# Patient Record
Sex: MALE | Race: WHITE | Employment: FULL TIME | URBAN - METROPOLITAN AREA
[De-identification: names, ages, dates, MRNs, and addresses within clinical notes are randomized per-mention and may not be internally consistent; named-entity substitution may affect disease eponyms.]

---

## 2019-02-02 ENCOUNTER — HOSPITAL ENCOUNTER (EMERGENCY)
Age: 29
Discharge: HOME | End: 2019-02-02
Attending: EMERGENCY MEDICINE | Admitting: EMERGENCY MEDICINE
Payer: MEDICAID

## 2019-02-02 ENCOUNTER — APPOINTMENT (OUTPATIENT)
Dept: GENERAL RADIOLOGY | Age: 29
End: 2019-02-02
Attending: PHYSICIAN ASSISTANT
Payer: MEDICAID

## 2019-02-02 VITALS
TEMPERATURE: 98.5 F | DIASTOLIC BLOOD PRESSURE: 68 MMHG | SYSTOLIC BLOOD PRESSURE: 118 MMHG | HEIGHT: 65 IN | WEIGHT: 130 LBS | BODY MASS INDEX: 21.66 KG/M2 | HEART RATE: 69 BPM | RESPIRATION RATE: 16 BRPM | OXYGEN SATURATION: 100 %

## 2019-02-02 DIAGNOSIS — G89.29 CHRONIC PAIN OF LEFT WRIST: Primary | ICD-10-CM

## 2019-02-02 DIAGNOSIS — M25.532 CHRONIC PAIN OF LEFT WRIST: Primary | ICD-10-CM

## 2019-02-02 DIAGNOSIS — M79.601 RIGHT ARM PAIN: ICD-10-CM

## 2019-02-02 PROCEDURE — 73090 X-RAY EXAM OF FOREARM: CPT

## 2019-02-02 PROCEDURE — 99283 EMERGENCY DEPT VISIT LOW MDM: CPT

## 2019-02-02 PROCEDURE — 73110 X-RAY EXAM OF WRIST: CPT

## 2019-02-02 PROCEDURE — 75810000053 HC SPLINT APPLICATION

## 2019-02-02 RX ORDER — IBUPROFEN 800 MG/1
800 TABLET ORAL
Qty: 20 TAB | Refills: 0 | Status: SHIPPED | OUTPATIENT
Start: 2019-02-02 | End: 2019-02-09

## 2019-02-02 RX ORDER — IBUPROFEN 400 MG/1
800 TABLET ORAL
Status: DISCONTINUED | OUTPATIENT
Start: 2019-02-02 | End: 2019-02-03 | Stop reason: HOSPADM

## 2019-02-02 RX ORDER — ACETAMINOPHEN 500 MG
1000 TABLET ORAL
Status: DISCONTINUED | OUTPATIENT
Start: 2019-02-02 | End: 2019-02-03 | Stop reason: HOSPADM

## 2019-02-02 NOTE — LETTER
[facility] [place] 
[facility] 
[address] [id number] 
[phone] Work/School Note Date: 2/2/2019 To Whom It May concern: 
 
Augusta Ward was seen and treated today in the emergency room by the following provider(s): 
Attending Provider: Hiawatha Dance, MD 
Physician Assistant: PETEY Simon. Augusta Ward limited use of left hand until cleared by orthopedics. Sincerely, Maddie Madden PA-C

## 2019-02-02 NOTE — ED TRIAGE NOTES
Pt states broke lt hand in 2016, seen here at that time, pt states he had no insurance and was unable to get hand fixed. 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 Pt states he is here to get note for limited duty. Pt states he still does not have insurance.

## 2019-02-02 NOTE — ED PROVIDER NOTES
EMERGENCY DEPARTMENT HISTORY AND PHYSICAL EXAM 
 
Date: 2/2/2019 Patient Name: Dayana Frankel History of Presenting Illness Chief Complaint Patient presents with  
 Hand Pain History Provided By: Patient Chief Complaint: Wrist pain Duration: 1 Days Timing:  Acute Location: Left wrist 
Modifying Factors: Pt notes his wrist was broken a few years ago and he did not get it fixed Associated Symptoms: left wrist swelling Additional History (Context):  
6:52 PM 
Dayana Frankel is a 29 y.o. male who presents to the emergency department C/O left wrist pain onset 5 years that worsened last night. Pt also c/o right arm pain onset a 1 month ago. Pt notes that when he was working last night he moved his left hand \"weirdly\" and it worsened the pain. Associated sxs include left wrist swelling. Pt notes he broke his left wrist a few years ago but did not follow up. Pt denies any other sxs or complaints. PCP: None Past History Past Medical History: 
History reviewed. No pertinent past medical history. Past Surgical History: 
History reviewed. No pertinent surgical history. Family History: 
History reviewed. No pertinent family history. Social History: 
Social History Tobacco Use  Smoking status: Current Some Day Smoker  Smokeless tobacco: Never Used Substance Use Topics  Alcohol use: Yes Comment: celebrations  Drug use: No  
 
 
Allergies: 
No Known Allergies Review of Systems Review of Systems Musculoskeletal: Positive for arthralgias (left wrist), joint swelling (left wrist) and myalgias (left wrist, right arm). Skin: Negative for wound. All other systems reviewed and are negative. Physical Exam  
 
Vitals:  
 02/02/19 1801 BP: 118/68 Pulse: 69 Resp: 16 Temp: 98.5 °F (36.9 °C) SpO2: 100% Weight: 59 kg (130 lb) Height: 5' 5\" (1.651 m) Physical Exam  
Constitutional: He is oriented to person, place, and time.  He appears well-developed and well-nourished. Alert, well appearing, non toxic HENT:  
Head: Normocephalic and atraumatic. Neck: Normal range of motion. Neck supple. Cardiovascular: Normal rate, regular rhythm, normal heart sounds and intact distal pulses. No murmur heard. Pulmonary/Chest: Effort normal and breath sounds normal. No respiratory distress. He has no wheezes. He has no rales. Musculoskeletal:  
     Arms: 
     Hands: 
Neurological: He is alert and oriented to person, place, and time. Skin: Skin is warm and dry. Psychiatric: He has a normal mood and affect. Judgment normal.  
Nursing note and vitals reviewed. Diagnostic Study Results Labs - No results found for this or any previous visit (from the past 12 hour(s)). Radiologic Studies -  
7:40 PM 
RADIOLOGY FINDINGS Left wrist X-ray shows no obvious fracture Pending review by Radiologist 
Recorded by Blair Farias ED Scribe, as dictated by Maddie Madden PA-C 
 
7:41 PM 
RADIOLOGY FINDINGS Right forearm X-ray shows no acute process Pending review by Radiologist 
Recorded by Blair Farias ED Scribe, as dictated by Maddie Madden PA-C 
 
XR WRIST LT AP/LAT/OBL MIN 3V    (Results Pending) XR FOREARM RT AP/LAT    (Results Pending) CT Results  (Last 48 hours) None CXR Results  (Last 48 hours) None Medications given in the ED- Medications  
ibuprofen (MOTRIN) tablet 800 mg (800 mg Oral Refused 2/2/19 2125)  
acetaminophen (TYLENOL) tablet 1,000 mg (1,000 mg Oral Refused 2/2/19 2125) Medical Decision Making I am the first provider for this patient. I reviewed the vital signs, available nursing notes, past medical history, past surgical history, family history and social history. Vital Signs-Reviewed the patient's vital signs. Pulse Oximetry Analysis - 100% on Room Air Records Reviewed: Nursing Notes Provider Notes (Medical Decision Making):  
 
Procedures: 
Procedures Procedure Note - Splint Assessment: 
8:51 PM 
Performed by: Chaz Richardson PA-C Splint to left wrist assessed. Neurovascularly intact. The procedure took 1-15 minutes, and pt tolerated well. Written by Andra Curry, ED Scribe, as dictated by Chaz Richardson PA-C. 
 
 
ED Course:  
6:52 PM  
Initial assessment performed. The patients presenting problems have been discussed, and they are in agreement with the care plan formulated and outlined with them. I have encouraged them to ask questions as they arise throughout their visit. Discussion: Pt presents with acute exacerbation of chronic left wrist pain that has been present since he fractured it 3 years ago. He never followed up with ortho. He is also complaining of right forearm tenderness. No deformities or bony tenderness. XRAYs negative for acute fracture, however given Snuff box tenderness on left, will place in thumb spika splint. Stressed importance of ortho follow up and that he needs to keep splint in place until cleared by orthopedists.  consult entered. Neurovascularly intact. Diagnosis and Disposition DISCHARGE NOTE: 
7:52 PM 
Curtis Mcnally's  results have been reviewed with him. He has been counseled regarding his diagnosis, treatment, and plan. He verbally conveys understanding and agreement of the signs, symptoms, diagnosis, treatment and prognosis and additionally agrees to follow up as discussed. He also agrees with the care-plan and conveys that all of his questions have been answered. I have also provided discharge instructions for him that include: educational information regarding their diagnosis and treatment, and list of reasons why they would want to return to the ED prior to their follow-up appointment, should his condition change. He has been provided with education for proper emergency department utilization. CLINICAL IMPRESSION: 
 
1. Chronic pain of left wrist   
2. Right arm pain PLAN: 
 1. D/C Home 2. There are no discharge medications for this patient. 3.  
Follow-up Information Follow up With Specialties Details Why Contact Info Arben Matos MD Orthopedic Surgery Schedule an appointment as soon as possible for a visit in 3 days For orthopedic follow up [address] [place] [address] [address] [facility] [address] 
[phone] [facility] EMERGENCY DEPT Emergency Medicine Go to As needed, if symptoms worsen [address] 
[address] [address] 
[phone]  
  
 
_______________________________ Attestations: This note is prepared by Maryann Lamas, acting as Scribe for Maddie Madden PA-C. Maddie Madden PA-C:  The scribe's documentation has been prepared under my direction and personally reviewed by me in its entirety. I confirm that the note above accurately reflects all work, treatment, procedures, and medical decision making performed by me. 
_______________________________

## 2019-02-03 NOTE — DISCHARGE INSTRUCTIONS
Patient Education        Joint Pain: Care Instructions  Your Care Instructions    Many people have small aches and pains from overuse or injury to muscles and joints. Joint injuries often happen during sports or recreation, work tasks, or projects around the home. An overuse injury can happen when you put too much stress on a joint or when you do an activity that stresses the joint over and over, such as using the computer or rowing a boat. You can take action at home to help your muscles and joints get better. You should feel better in 1 to 2 weeks, but it can take 3 months or more to heal completely. Follow-up care is a key part of your treatment and safety. Be sure to make and go to all appointments, and call your doctor if you are having problems. It's also a good idea to know your test results and keep a list of the medicines you take. How can you care for yourself at home? · Do not put weight on the injured joint for at least a day or two. · For the first day or two after an injury, do not take hot showers or baths, and do not use hot packs. The heat could make swelling worse. · Put ice or a cold pack on the sore joint for 10 to 20 minutes at a time. Try to do this every 1 to 2 hours for the next 3 days (when you are awake) or until the swelling goes down. Put a thin cloth between the ice and your skin. · Wrap the injury in an elastic bandage. Do not wrap it too tightly because this can cause more swelling. · Prop up the sore joint on a pillow when you ice it or anytime you sit or lie down during the next 3 days. Try to keep it above the level of your heart. This will help reduce swelling. · Take an over-the-counter pain medicine, such as acetaminophen (Tylenol), ibuprofen (Advil, Motrin), or naproxen (Aleve). Read and follow all instructions on the label. · After 1 or 2 days of rest, begin moving the joint gently.  While the joint is still healing, you can begin to exercise using activities that do not strain or hurt the painful joint. When should you call for help? Call your doctor now or seek immediate medical care if:    · You have signs of infection, such as:  ? Increased pain, swelling, warmth, and redness. ? Red streaks leading from the joint. ? A fever.    Watch closely for changes in your health, and be sure to contact your doctor if:    · Your movement or symptoms are not getting better after 1 to 2 weeks of home treatment. Where can you learn more? Go to http://edna-ronaldo.info/. Enter P205 in the search box to learn more about \"Joint Pain: Care Instructions. \"  Current as of: September 20, 2018  Content Version: 11.9  © 2588-1469 [employer]. Care instructions adapted under license by [employer] (which disclaims liability or warranty for this information). If you have questions about a medical condition or this instruction, always ask your healthcare professional. [address] [address] any warranty or liability for your use of this information. Patient Education        Chronic Pain: Care Instructions  Your Care Instructions    Chronic pain is pain that lasts a long time (months or even years) and may or may not have a clear cause. It is different from acute pain, which usually does have a clear cause--like an injury or illness--and gets better over time. Chronic pain:  · Lasts over time but may vary from day to day. · Does not go away despite efforts to end it. · May disrupt your sleep and lead to fatigue. · May cause depression or anxiety. · May make your muscles tense, causing more pain. · Can disrupt your work, hobbies, home life, and relationships with friends and family. Chronic pain is a very real condition. It is not just in your head. Treatment can help and usually includes several methods used together, such as medicines, physical therapy, exercise, and other treatments.  Learning how to relax and changing negative thought patterns can also help you cope. Chronic pain is complex. Taking an active role in your treatment will help you better manage your pain. Tell your doctor if you have trouble dealing with your pain. You may have to try several things before you find what works best for you. Follow-up care is a key part of your treatment and safety. Be sure to make and go to all appointments, and call your doctor if you are having problems. It's also a good idea to know your test results and keep a list of the medicines you take. How can you care for yourself at home? · Pace yourself. Break up large jobs into smaller tasks. Save harder tasks for days when you have less pain, or go back and forth between hard tasks and easier ones. Take rest breaks. · Relax, and reduce stress. Relaxation techniques such as deep breathing or meditation can help. · Keep moving. Gentle, daily exercise can help reduce pain over the long run. Try low- or no-impact exercises such as walking, swimming, and stationary biking. Do stretches to stay flexible. · Try heat, cold packs, and massage. · Get enough sleep. Chronic pain can make you tired and drain your energy. Talk with your doctor if you have trouble sleeping because of pain. · Think positive. Your thoughts can affect your pain level. Do things that you enjoy to distract yourself when you have pain instead of focusing on the pain. See a movie, read a book, listen to music, or spend time with a friend. · If you think you are depressed, talk to your doctor about treatment. · Keep a daily pain diary. Record how your moods, thoughts, sleep patterns, activities, and medicine affect your pain. You may find that your pain is worse during or after certain activities or when you are feeling a certain emotion. Having a record of your pain can help you and your doctor find the best ways to treat your pain. · Take pain medicines exactly as directed.   ? If the doctor gave you a prescription medicine for pain, take it as prescribed. ? If you are not taking a prescription pain medicine, ask your doctor if you can take an over-the-counter medicine. Reducing constipation caused by pain medicine  · Include fruits, vegetables, beans, and whole grains in your diet each day. These foods are high in fiber. · Drink plenty of fluids, enough so that your urine is light yellow or clear like water. If you have kidney, heart, or liver disease and have to limit fluids, talk with your doctor before you increase the amount of fluids you drink. · If your doctor recommends it, get more exercise. Walking is a good choice. Bit by bit, increase the amount you walk every day. Try for at least 30 minutes on most days of the week. · Schedule time each day for a bowel movement. A daily routine may help. Take your time and do not strain when having a bowel movement. When should you call for help? Call your doctor now or seek immediate medical care if:    · Your pain gets worse or is out of control.     · You feel down or blue, or you do not enjoy things like you once did. You may be depressed, which is common in people with chronic pain. Depression can be treated.     · You have vomiting or cramps for more than 2 hours.    Watch closely for changes in your health, and be sure to contact your doctor if:    · You cannot sleep because of pain.     · You are very worried or anxious about your pain.     · You have trouble taking your pain medicine.     · You have any concerns about your pain medicine.     · You have trouble with bowel movements, such as:  ? No bowel movement in 3 days. ? Blood in the anal area, in your stool, or on the toilet paper. ? Diarrhea for more than 24 hours. Where can you learn more? Go to http://edna-ronaldo.info/. Enter N004 in the search box to learn more about \"Chronic Pain: Care Instructions. \"  Current as of: Deepti 3, 2018  Content Version: 11.9  © 2140-7018 [employer]. Care instructions adapted under license by [employer] (which disclaims liability or warranty for this information). If you have questions about a medical condition or this instruction, always ask your healthcare professional. [address] any warranty or liability for your use of this information.

## 2019-04-23 ENCOUNTER — HOSPITAL ENCOUNTER (EMERGENCY)
Age: 29
Discharge: HOME | End: 2019-04-23
Attending: EMERGENCY MEDICINE | Admitting: EMERGENCY MEDICINE
Payer: MEDICAID

## 2019-04-23 VITALS
BODY MASS INDEX: 20.66 KG/M2 | TEMPERATURE: 96.2 F | RESPIRATION RATE: 14 BRPM | SYSTOLIC BLOOD PRESSURE: 113 MMHG | HEIGHT: 65 IN | DIASTOLIC BLOOD PRESSURE: 71 MMHG | WEIGHT: 124 LBS | OXYGEN SATURATION: 100 % | HEART RATE: 57 BPM

## 2019-04-23 DIAGNOSIS — R74.8 ELEVATED LIPASE: ICD-10-CM

## 2019-04-23 DIAGNOSIS — R10.13 ABDOMINAL PAIN, EPIGASTRIC: ICD-10-CM

## 2019-04-23 DIAGNOSIS — F17.210 CIGARETTE NICOTINE DEPENDENCE WITHOUT COMPLICATION: Primary | ICD-10-CM

## 2019-04-23 LAB
ALBUMIN SERPL-MCNC: 4.2 G/DL (ref 3.4–5)
ALBUMIN/GLOB SERPL: 1.4 {RATIO} (ref 0.8–1.7)
ALP SERPL-CCNC: 83 U/L (ref 45–117)
ALT SERPL-CCNC: 34 U/L (ref 16–61)
ANION GAP SERPL CALC-SCNC: 5 MMOL/L (ref 3–18)
APPEARANCE UR: CLEAR
AST SERPL-CCNC: 26 U/L (ref 15–37)
BASOPHILS # BLD: 0 K/UL (ref 0–0.1)
BASOPHILS NFR BLD: 0 % (ref 0–2)
BILIRUB SERPL-MCNC: 1 MG/DL (ref 0.2–1)
BILIRUB UR QL: NEGATIVE
BUN SERPL-MCNC: 13 MG/DL (ref 7–18)
BUN/CREAT SERPL: 14 (ref 12–20)
CALCIUM SERPL-MCNC: 8.9 MG/DL (ref 8.5–10.1)
CHLORIDE SERPL-SCNC: 105 MMOL/L (ref 100–108)
CO2 SERPL-SCNC: 30 MMOL/L (ref 21–32)
COLOR UR: YELLOW
CREAT SERPL-MCNC: 0.94 MG/DL (ref 0.6–1.3)
DIFFERENTIAL METHOD BLD: ABNORMAL
EOSINOPHIL # BLD: 0.2 K/UL (ref 0–0.4)
EOSINOPHIL NFR BLD: 3 % (ref 0–5)
ERYTHROCYTE [DISTWIDTH] IN BLOOD BY AUTOMATED COUNT: 12.6 % (ref 11.6–14.5)
GLOBULIN SER CALC-MCNC: 2.9 G/DL (ref 2–4)
GLUCOSE SERPL-MCNC: 90 MG/DL (ref 74–99)
GLUCOSE UR STRIP.AUTO-MCNC: NEGATIVE MG/DL
HCT VFR BLD AUTO: 47.6 % (ref 36–48)
HGB BLD-MCNC: 16.2 G/DL (ref 13–16)
HGB UR QL STRIP: NEGATIVE
KETONES UR QL STRIP.AUTO: NEGATIVE MG/DL
LEUKOCYTE ESTERASE UR QL STRIP.AUTO: NEGATIVE
LIPASE SERPL-CCNC: 858 U/L (ref 73–393)
LYMPHOCYTES # BLD: 1.8 K/UL (ref 0.9–3.6)
LYMPHOCYTES NFR BLD: 33 % (ref 21–52)
MAGNESIUM SERPL-MCNC: 2.3 MG/DL (ref 1.6–2.6)
MCH RBC QN AUTO: 29.2 PG (ref 24–34)
MCHC RBC AUTO-ENTMCNC: 34 G/DL (ref 31–37)
MCV RBC AUTO: 85.9 FL (ref 74–97)
MONOCYTES # BLD: 0.3 K/UL (ref 0.05–1.2)
MONOCYTES NFR BLD: 6 % (ref 3–10)
NEUTS SEG # BLD: 3.1 K/UL (ref 1.8–8)
NEUTS SEG NFR BLD: 58 % (ref 40–73)
NITRITE UR QL STRIP.AUTO: NEGATIVE
PH UR STRIP: 7 [PH] (ref 5–8)
PLATELET # BLD AUTO: 227 K/UL (ref 135–420)
PMV BLD AUTO: 10.2 FL (ref 9.2–11.8)
POTASSIUM SERPL-SCNC: 4.7 MMOL/L (ref 3.5–5.5)
PROT SERPL-MCNC: 7.1 G/DL (ref 6.4–8.2)
PROT UR STRIP-MCNC: NEGATIVE MG/DL
RBC # BLD AUTO: 5.54 M/UL (ref 4.7–5.5)
SODIUM SERPL-SCNC: 140 MMOL/L (ref 136–145)
SP GR UR REFRACTOMETRY: 1.02 (ref 1–1.03)
UROBILINOGEN UR QL STRIP.AUTO: 0.2 EU/DL (ref 0.2–1)
WBC # BLD AUTO: 5.4 K/UL (ref 4.6–13.2)

## 2019-04-23 PROCEDURE — 83735 ASSAY OF MAGNESIUM: CPT

## 2019-04-23 PROCEDURE — 81003 URINALYSIS AUTO W/O SCOPE: CPT

## 2019-04-23 PROCEDURE — 80053 COMPREHEN METABOLIC PANEL: CPT

## 2019-04-23 PROCEDURE — 96365 THER/PROPH/DIAG IV INF INIT: CPT

## 2019-04-23 PROCEDURE — 99283 EMERGENCY DEPT VISIT LOW MDM: CPT

## 2019-04-23 PROCEDURE — 74011250636 HC RX REV CODE- 250/636: Performed by: EMERGENCY MEDICINE

## 2019-04-23 PROCEDURE — 83690 ASSAY OF LIPASE: CPT

## 2019-04-23 PROCEDURE — 96375 TX/PRO/DX INJ NEW DRUG ADDON: CPT

## 2019-04-23 PROCEDURE — 85025 COMPLETE CBC W/AUTO DIFF WBC: CPT

## 2019-04-23 RX ORDER — ACETAMINOPHEN 325 MG/1
650 TABLET ORAL
Qty: 20 TAB | Refills: 0 | Status: SHIPPED | OUTPATIENT
Start: 2019-04-23 | End: 2019-04-26

## 2019-04-23 RX ORDER — DICYCLOMINE HYDROCHLORIDE 10 MG/1
10 CAPSULE ORAL 4 TIMES DAILY
Qty: 20 CAP | Refills: 0 | Status: SHIPPED | OUTPATIENT
Start: 2019-04-23 | End: 2019-04-28

## 2019-04-23 RX ORDER — FAMOTIDINE 10 MG/ML
20 INJECTION INTRAVENOUS
Status: COMPLETED | OUTPATIENT
Start: 2019-04-23 | End: 2019-04-23

## 2019-04-23 RX ORDER — PANTOPRAZOLE SODIUM 40 MG/1
40 TABLET, DELAYED RELEASE ORAL DAILY
Qty: 20 TAB | Refills: 0 | Status: SHIPPED | OUTPATIENT
Start: 2019-04-23 | End: 2019-05-13

## 2019-04-23 RX ORDER — ACETAMINOPHEN 10 MG/ML
1000 INJECTION, SOLUTION INTRAVENOUS ONCE
Status: COMPLETED | OUTPATIENT
Start: 2019-04-23 | End: 2019-04-23

## 2019-04-23 RX ADMIN — SODIUM CHLORIDE 1000 ML: 900 INJECTION, SOLUTION INTRAVENOUS at 09:14

## 2019-04-23 RX ADMIN — FAMOTIDINE 20 MG: 10 INJECTION, SOLUTION INTRAVENOUS at 09:14

## 2019-04-23 RX ADMIN — ACETAMINOPHEN 1000 MG: 10 INJECTION, SOLUTION INTRAVENOUS at 09:14

## 2019-04-23 NOTE — ED PROVIDER NOTES
EMERGENCY DEPARTMENT HISTORY AND PHYSICAL EXAM 
 
Date: 4/23/2019 Patient Name: Zhang Gaffney History of Presenting Illness Chief Complaint Patient presents with  Abdominal Pain History Provided By: Patient Additional History (Context):  
Zhang Gaffney is a 29 y.o. male with no significant past medical history presents to the emergency department C/O 2 days of generalized upper quadrant achy abdominal pain that circumferential and radiates around his back. Pt denies nausea, vomiting, diarrhea, dysuria, hematuria and any other sxs or complaints. States that he has never had this pain before. He reports that his last bowel movement was 2 days prior. Denies any prior abdominal surgeries. PCP: Prema, MD Mookie 
 
 
 
Past History Past Medical History: 
History reviewed. No pertinent past medical history. Past Surgical History: 
History reviewed. No pertinent surgical history. Family History: 
History reviewed. No pertinent family history. Social History: 
Social History Tobacco Use  Smoking status: Current Some Day Smoker  Smokeless tobacco: Never Used Substance Use Topics  Alcohol use: Yes Comment: celebrations  Drug use: No  
 
 
Allergies: Allergies Allergen Reactions  Ibuprofen Other (comments) Sweats Review of Systems Review of Systems Constitutional: Negative for chills and fever. HENT: Negative for congestion, ear pain, sinus pain and sore throat. Eyes: Negative for pain and visual disturbance. Respiratory: Negative for cough and shortness of breath. Cardiovascular: Negative for chest pain and leg swelling. Gastrointestinal: Positive for abdominal pain. Negative for constipation, diarrhea, nausea and vomiting. Genitourinary: Negative for dysuria and hematuria. Musculoskeletal: Positive for back pain. Negative for neck pain. Skin: Negative for pallor and rash. Neurological: Negative for dizziness, tremors, weakness, light-headedness and headaches. All other systems reviewed and are negative. Physical Exam  
 
Vitals:  
 04/23/19 1948 BP: 113/71 Pulse: (!) 57 Resp: 14 Temp: 96.2 °F (35.7 °C) SpO2: 100% Weight: 56.2 kg (124 lb) Height: 5' 5\" (1.651 m) Physical Exam 
 
Nursing note and vitals reviewed [facility]: [facility] no [facility] distress Head: Normocephalic, Atraumatic Eyes: Pupils are equal, round, and reactive to light, EOMI Neck: Supple, non-tender Cardiovascular: Regular rate and rhythm, no murmurs, rubs, or gallops Chest: Normal work of breathing and chest excursion bilaterally Lungs: Clear to ausculation bilaterally, no wheezes, no rhonchi Abdomen: Soft, non tender, non distended, normoactive bowel sounds Back: No evidence of trauma or deformity Extremities: No evidence of trauma or deformity, no LE edema Skin: Warm and dry, normal cap refill Neuro: Alert and appropriate, CN intact, normal speech, moving all 4 extremities freely and symmetrically Psychiatric: Normal mood and affect Diagnostic Study Results Labs - Recent Results (from the past 12 hour(s)) CBC WITH AUTOMATED DIFF Collection Time: 04/23/19  9:05 AM  
Result Value Ref Range WBC 5.4 4.6 - 13.2 K/uL  
 RBC 5.54 (H) 4.70 - 5.50 M/uL  
 HGB 16.2 (H) 13.0 - 16.0 g/dL HCT 47.6 36.0 - 48.0 % MCV 85.9 74.0 - 97.0 FL  
 MCH 29.2 24.0 - 34.0 PG  
 MCHC 34.0 31.0 - 37.0 g/dL  
 RDW 12.6 11.6 - 14.5 % PLATELET 630 096 - 487 K/uL MPV 10.2 9.2 - 11.8 FL  
 NEUTROPHILS 58 40 - 73 % LYMPHOCYTES 33 21 - 52 % MONOCYTES 6 3 - 10 % EOSINOPHILS 3 0 - 5 % BASOPHILS 0 0 - 2 %  
 ABS. NEUTROPHILS 3.1 1.8 - 8.0 K/UL  
 ABS. LYMPHOCYTES 1.8 0.9 - 3.6 K/UL  
 ABS. MONOCYTES 0.3 0.05 - 1.2 K/UL  
 ABS. EOSINOPHILS 0.2 0.0 - 0.4 K/UL  
 ABS. BASOPHILS 0.0 0.0 - 0.1 K/UL  
 DF AUTOMATED METABOLIC PANEL, COMPREHENSIVE Collection Time: 04/23/19  9:05 AM  
Result Value Ref Range Sodium 140 136 - 145 mmol/L Potassium 4.7 3.5 - 5.5 mmol/L Chloride 105 100 - 108 mmol/L  
 CO2 30 21 - 32 mmol/L Anion gap 5 3.0 - 18 mmol/L Glucose 90 74 - 99 mg/dL BUN 13 7.0 - 18 MG/DL Creatinine 0.94 0.6 - 1.3 MG/DL  
 BUN/Creatinine ratio 14 12 - 20 GFR est AA >60 >60 ml/min/1.73m2 GFR est non-AA >60 >60 ml/min/1.73m2 Calcium 8.9 8.5 - 10.1 MG/DL Bilirubin, total 1.0 0.2 - 1.0 MG/DL  
 ALT (SGPT) 34 16 - 61 U/L  
 AST (SGOT) 26 15 - 37 U/L Alk. phosphatase 83 45 - 117 U/L Protein, total 7.1 6.4 - 8.2 g/dL Albumin 4.2 3.4 - 5.0 g/dL Globulin 2.9 2.0 - 4.0 g/dL A-G Ratio 1.4 0.8 - 1.7 LIPASE Collection Time: 04/23/19  9:05 AM  
Result Value Ref Range Lipase 858 (H) 73 - 393 U/L MAGNESIUM Collection Time: 04/23/19  9:05 AM  
Result Value Ref Range Magnesium 2.3 1.6 - 2.6 mg/dL URINALYSIS W/ RFLX MICROSCOPIC Collection Time: 04/23/19  9:13 AM  
Result Value Ref Range Color YELLOW Appearance CLEAR Specific gravity 1.022 1.005 - 1.030    
 pH (UA) 7.0 5.0 - 8.0 Protein NEGATIVE  NEG mg/dL Glucose NEGATIVE  NEG mg/dL Ketone NEGATIVE  NEG mg/dL Bilirubin NEGATIVE  NEG Blood NEGATIVE  NEG Urobilinogen 0.2 0.2 - 1.0 EU/dL Nitrites NEGATIVE  NEG Leukocyte Esterase NEGATIVE  NEG Radiologic Studies - No orders to display CT Results  (Last 48 hours) None CXR Results  (Last 48 hours) None Medical Decision Making I am the first provider for this patient. I reviewed the vital signs, available nursing notes, past medical history, past surgical history, family history and social history. Vital Signs-Reviewed the patient's vital signs. Pulse Oximetry Analysis -100 % on room air Records Reviewed: Nursing Notes and Old Medical Records Provider Notes: 29 y.o. male presenting with circumferential upper quadrant abdominal pain that radiates around to his back. On exam patient is afebrile with appropriate vital signs. Benign abdominal exam.  Will obtain labs including lipase. We will also check UA to evaluate for hematuria, as patient symptoms and history suspicious for kidney stones. Will treat symptomatically with Pepcid, Tylenol and reassess. Procedures: 
Procedures ED Course:  
8:51 AM 
 Initial assessment performed. The patients presenting problems have been discussed, and they are in agreement with the care plan formulated and outlined with them. I have encouraged them to ask questions as they arise throughout their visit. SMOKING CESSATION: 
The patient was counseled on the dangers of tobacco use, and was advised to quit. Reviewed strategies to maximize success, including removing cigarettes and smoking materials from environment. Discussion took 3-5 minutes, and pt expressed understanding. 10:53 AM 
UA with no hematuria. Not consistent with UTI. Patient's lipase elevated at 853 however not meeting acute pancreatitis criteria. No elevation in LFTs. Patient denying alcohol use. On reassessment, patient continues to have a benign abdominal exam, no indication for emergent imaging studies at this time. Patient able to tolerate p.o. Patient urged to transition to clear liquid diet, bland diet until abdominal pain has completely resolved. Urged close follow-up with his primary care physician. Will discharge with Bentyl, Protonix and Tylenol as needed. Diagnosis and Disposition DISCHARGE NOTE: 
10:53 AM 
 
Kota De La Paz  results have been reviewed with him. He has been counseled regarding his diagnosis, treatment, and plan. He verbally conveys understanding and agreement of the signs, symptoms, diagnosis, treatment and prognosis and additionally agrees to follow up as discussed. He also agrees with the care-plan and conveys that all of his questions have been answered. I have also provided discharge instructions for him that include: educational information regarding their diagnosis and treatment, and list of reasons why they would want to return to the ED prior to their follow-up appointment, should his condition change. He has been provided with education for proper emergency department utilization. CLINICAL IMPRESSION: 
 
1. Cigarette nicotine dependence without complication 2. Elevated lipase 3. Abdominal pain, epigastric PLAN: 
1. D/C Home 2. Current Discharge Medication List  
  
START taking these medications Details  
pantoprazole (PROTONIX) 40 mg tablet Take 1 Tab by mouth daily for 20 days. Qty: 20 Tab, Refills: 0  
  
dicyclomine (BENTYL) 10 mg capsule Take 1 Cap by mouth four (4) times daily for 5 days. Qty: 20 Cap, Refills: 0  
  
acetaminophen (TYLENOL) 325 mg tablet Take 2 Tabs by mouth every four (4) hours as needed for Pain. Qty: 20 Tab, Refills: 0  
  
  
 
3. Follow-up Information Follow up With Specialties Details Why Contact Info [address]  Schedule an appointment as soon as possible for a visit in 2 days  [address] [place] [address] [address] [place] [address] 
[phone] [facility] EMERGENCY DEPT Emergency Medicine  As needed if symptoms worsen [address] [place] [address] [phone]  
  
 
____________________________________ Please note that this dictation was completed with [employer], the [employer] voice recognition software. Quite often unanticipated grammatical, syntax, homophones, and other interpretive errors are inadvertently transcribed by the computer software. Please disregard these errors. Please excuse any errors that have escaped final proofreading.

## 2019-04-23 NOTE — ED TRIAGE NOTES
Patient ambulatory to ED with C/O left sided abdominal Pain radiating to back that started 2 days ago. Patient denies fever

## 2019-04-23 NOTE — LETTER
[facility] [place] 
[facility] 
[address] [address] 
[phone] Work/School Note Date: 4/23/2019 To Whom It May concern: 
 
Jagdeep Robb was seen and treated today in the emergency room by the following provider(s): 
Attending Provider: Genesis Rodriguez DO. Jagdeep Robb may return to work on 04/25/19 Sincerely,  Merissa Barry, DO

## 2019-04-23 NOTE — DISCHARGE INSTRUCTIONS
You were seen and evaluated in the Emergency Department. Please understand that your work up is not all encompassing and you should follow up with your primary care physician for further management and continuity of care. Please return to Emergency Department or seek medical attention immediately if you have acute worsening in your symptoms or develop chest pain, shortness of breath, repeated vomiting, fever, altered level of consciousness, coughing up blood, or start sweating and feel clammy. If you were prescribed any medicine for home, please take as prescribed by your health-care provider. If you were given any follow-up appointments or numbers to call, please do so as instructed. Avoid any tobacco products or excessive alcohol. Patient Education        Abdominal Pain: Care Instructions  Your Care Instructions    Abdominal pain has many possible causes. Some aren't serious and get better on their own in a few days. Others need more testing and treatment. If your pain continues or gets worse, you need to be rechecked and may need more tests to find out what is wrong. You may need surgery to correct the problem. Don't ignore new symptoms, such as fever, nausea and vomiting, urination problems, pain that gets worse, and dizziness. These may be signs of a more serious problem. Your doctor may have recommended a follow-up visit in the next 8 to 12 hours. If you are not getting better, you may need more tests or treatment. The doctor has checked you carefully, but problems can develop later. If you notice any problems or new symptoms, get medical treatment right away. Follow-up care is a key part of your treatment and safety. Be sure to make and go to all appointments, and call your doctor if you are having problems. It's also a good idea to know your test results and keep a list of the medicines you take. How can you care for yourself at home? · Rest until you feel better.   · To prevent dehydration, drink plenty of fluids, enough so that your urine is light yellow or clear like water. Choose water and other caffeine-free clear liquids until you feel better. If you have kidney, heart, or liver disease and have to limit fluids, talk with your doctor before you increase the amount of fluids you drink. · If your stomach is upset, eat mild foods, such as rice, dry toast or crackers, bananas, and applesauce. Try eating several small meals instead of two or three large ones. · Wait until 48 hours after all symptoms have gone away before you have spicy foods, alcohol, and drinks that contain caffeine. · Do not eat foods that are high in fat. · Avoid anti-inflammatory medicines such as aspirin, ibuprofen (Advil, Motrin), and naproxen (Aleve). These can cause stomach upset. Talk to your doctor if you take daily aspirin for another health problem. When should you call for help? Call 911 anytime you think you may need emergency care. For example, call if:    · You passed out (lost consciousness).     · You pass maroon or very bloody stools.     · You vomit blood or what looks like coffee grounds.     · You have new, severe belly pain.    Call your doctor now or seek immediate medical care if:    · Your pain gets worse, especially if it becomes focused in one area of your belly.     · You have a new or higher fever.     · Your stools are black and look like tar, or they have streaks of blood.     · You have unexpected vaginal bleeding.     · You have symptoms of a urinary tract infection. These may include:  ? Pain when you urinate. ? Urinating more often than usual.  ? Blood in your urine.     · You are dizzy or lightheaded, or you feel like you may faint.    Watch closely for changes in your health, and be sure to contact your doctor if:    · You are not getting better after 1 day (24 hours). Where can you learn more? Go to http://edna-ronaldo.info/.   Enter J573 in the search box to learn more about \"Abdominal Pain: Care Instructions. \"  Current as of: September 23, 2018  Content Version: 11.9  © 2598-1520 [employer]. Care instructions adapted under license by [employer] (which disclaims liability or warranty for this information). If you have questions about a medical condition or this instruction, always ask your healthcare professional. [facility] any warranty or liability for your use of this information. Patient Education        Indigestion (Dyspepsia or Heartburn): Care Instructions  Your Care Instructions  Sometimes it can be hard to pinpoint the cause of indigestion. (It is also called dyspepsia or heartburn.) Most cases of an upset stomach with bloating, burning, burping, and nausea are minor and go away within several hours. Home treatment and over-the-counter medicine often are able to control symptoms. But if you take medicine to relieve your indigestion without making diet and lifestyle changes, your symptoms are likely to return again and again. If you get indigestion often, it may be a sign of a more serious medical problem. Be sure to follow up with your doctor, who may want to do tests to be sure of the cause of your indigestion. Follow-up care is a key part of your treatment and safety. Be sure to make and go to all appointments, and call your doctor if you are having problems. It's also a good idea to know your test results and keep a list of the medicines you take. How can you care for yourself at home? · Your doctor may recommend over-the-counter medicine. For mild or occasional indigestion, antacids such as Gaviscon, Mylanta, Maalox, or Tums, may help. Be safe with medicines. Be careful when you take over-the-counter antacid medicines. Many of these medicines have aspirin in them. Read the label to make sure that you are not taking more than the recommended dose. Too much aspirin can be harmful.   · Your doctor also may recommend over-the-counter acid reducers, such as Pepcid AC, Tagamet HB, Zantac 75, or Prilosec. Read and follow all instructions on the label. If you use these medicines often, talk with your doctor. · Change your eating habits. ? It's best to eat several small meals instead of two or three large meals. ? After you eat, wait 2 to 3 hours before you lie down. ? Chocolate, mint, and alcohol can make GERD worse. ? Spicy foods, foods that have a lot of acid (like tomatoes and oranges), and coffee can make GERD symptoms worse in some people. If your symptoms are worse after you eat a certain food, you may want to stop eating that food to see if your symptoms get better. · Do not smoke or chew tobacco. Smoking can make GERD worse. If you need help quitting, talk to your doctor about stop-smoking programs and medicines. These can increase your chances of quitting for good. · If you have GERD symptoms at night, raise the head of your bed 6 to 8 inches. You can do this by putting the frame on blocks or placing a foam wedge under the head of your mattress. (Adding extra pillows does not work.)  · Do not wear tight clothing around your middle. · Lose weight if you need to. Losing just 5 to 10 pounds can help. · Do not take anti-inflammatory medicines, such as aspirin, ibuprofen (Advil, Motrin), or naproxen (Aleve). These can irritate the stomach. If you need a pain medicine, try acetaminophen (Tylenol), which does not cause stomach upset. When should you call for help? Call your doctor now or seek immediate medical care if:    · You have new or worse belly pain.     · You are vomiting.    Watch closely for changes in your health, and be sure to contact your doctor if:    · You have new or worse symptoms of indigestion.     · You have trouble or pain swallowing.     · You are losing weight.     · You do not get better as expected. Where can you learn more?   Go to http://ednaHealthDataInsights.info/. Enter W233 in the search box to learn more about \"Indigestion (Dyspepsia or Heartburn): Care Instructions. \"  Current as of: March 27, 2018  Content Version: 11.9  © 6710-5811 [email or web address]. Care instructions adapted under license by [employer] (which disclaims liability or warranty for this information). If you have questions about a medical condition or this instruction, always ask your healthcare professional. [place] [address] any warranty or liability for your use of this information. Patient Education        Learning About Clear Liquid Diets  Introduction    Sometimes you have to stop eating solid food for a while. This may be because you're preparing for a medical procedure or recovering from one. Or solid food might irritate your digestive tract because of illness. But even when you can't eat solid food, your body still needs liquids and energy. A clear liquid diet gives your body basic nutrition until you can go back to eating solid food. Your body digests these foods easily, and these foods don't leave behind any solids as they pass through your system. The clear liquid diet helps reduce the nausea and diarrhea you may have while you're ill. A clear liquid diet isn't very satisfying. And it doesn't supply all the nutrients you need over the long term. But you will be on this diet for just a day or two. After that, you will probably start eating bland foods. Soon you'll be able to return to your regular diet. Examples  A clear liquid diet may include:  · Clear, fat-free broth and bouillon. · Water, ice chips, and flavored ice pops. · Strained fruit juices and flavored fruit drinks. · Fruit-flavored gelatin, like Jell-O.  · Soft drinks like lemon-lime soda and ginger ale. · Sweetened coffee or tea without cream.  Where can you learn more? Go to http://ednaMo Industries Holdingsronaldo.info/.   Enter C536 in the search box to learn more about \"Learning About Clear Liquid Diets. \"  Current as of: March 28, 2018  Content Version: 11.9  © 6366-3480 [employer]. Care instructions adapted under license by [employer] (which disclaims liability or warranty for this information). If you have questions about a medical condition or this instruction, always ask your healthcare professional. [address] any warranty or liability for your use of this information. Patient Education        Soft-Textured, Tameka Cam Diet: Care Instructions  Your Care Instructions    A soft-textured, bland diet is used when you need food that is easy to chew, swallow, and digest. You will need to choose soft foods that are low in spices and seasonings. You will need to avoid high-fat foods, as well as caffeine and alcohol. Your doctor or dietitian can help you plan a soft-textured, bland diet based on your health and what you prefer to eat. Ask your doctor how long you should stay on this diet. As you get better, you will probably be able to go back to a regular diet. Talk with your doctor or dietitian before you make changes in your diet. Follow-up care is a key part of your treatment and safety. Be sure to make and go to all appointments, and call your doctor if you are having problems. It's also a good idea to know your test results and keep a list of the medicines you take. How can you care for yourself at home? · Choose foods that are easy to chew and swallow. Good choices are mashed potatoes, soft breads and rolls, cream soups, oatmeal, and Cream of Wheat. · Choose soft, well-cooked vegetables and soft or canned fruits. Good choices are applesauce, ripe bananas, and non-citrus fruit juice. · Try milk, yogurt, or other milk products, if you can digest dairy without too many problems. Your doctor may limit milk and milk products for a while.  If so, he or she may recommend a calcium and vitamin D supplement. · Choose soft protein foods such as eggs, tofu, steamed fish, chicken, and turkey. Slow-cooking methods, such as stewing, will help soften meat. Chopping meat in a  or  also will make it easier to eat. · Avoid nuts, raw vegetables, hard crackers, tough meats, and prunes and prune juice. · Avoid foods that are very spicy, such as foods seasoned with black pepper, chili peppers, horseradish, or hot sauce. · Avoid highly acidic foods such as citrus fruits, citrus fruit juices, and tomato-based foods. · Avoid high-fat foods such as fried meat, chips, and rich desserts. · Check with your doctor before you drink alcohol or beverages that have caffeine, such as coffee, tea, and cola beverages. Where can you learn more? Go to http://edna-ronaldo.info/. Enter A672 in the search box to learn more about \"Soft-Textured, Tamara Barr Diet: Care Instructions. \"  Current as of: March 28, 2018  Content Version: 11.9  © 5968-0793 [employer]. Care instructions adapted under license by [employer] (which disclaims liability or warranty for this information). If you have questions about a medical condition or this instruction, always ask your healthcare professional. [address] any warranty or liability for your use of this information.

## 2019-04-26 ENCOUNTER — APPOINTMENT (OUTPATIENT)
Dept: GENERAL RADIOLOGY | Age: 29
End: 2019-04-26
Attending: EMERGENCY MEDICINE
Payer: MEDICAID

## 2019-04-26 ENCOUNTER — APPOINTMENT (OUTPATIENT)
Dept: ULTRASOUND IMAGING | Age: 29
End: 2019-04-26
Attending: EMERGENCY MEDICINE
Payer: MEDICAID

## 2019-04-26 ENCOUNTER — HOSPITAL ENCOUNTER (EMERGENCY)
Age: 29
Discharge: HOME | End: 2019-04-26
Attending: EMERGENCY MEDICINE
Payer: MEDICAID

## 2019-04-26 VITALS
BODY MASS INDEX: 21.83 KG/M2 | WEIGHT: 131 LBS | TEMPERATURE: 98.1 F | HEART RATE: 91 BPM | RESPIRATION RATE: 18 BRPM | DIASTOLIC BLOOD PRESSURE: 75 MMHG | OXYGEN SATURATION: 100 % | HEIGHT: 65 IN | SYSTOLIC BLOOD PRESSURE: 115 MMHG

## 2019-04-26 DIAGNOSIS — F17.200 TOBACCO USE DISORDER: ICD-10-CM

## 2019-04-26 DIAGNOSIS — R10.13 ABDOMINAL PAIN, EPIGASTRIC: Primary | ICD-10-CM

## 2019-04-26 LAB
ALBUMIN SERPL-MCNC: 3.7 G/DL (ref 3.4–5)
ALBUMIN/GLOB SERPL: 1.5 {RATIO} (ref 0.8–1.7)
ALP SERPL-CCNC: 73 U/L (ref 45–117)
ALT SERPL-CCNC: 39 U/L (ref 16–61)
ANION GAP SERPL CALC-SCNC: 8 MMOL/L (ref 3–18)
APPEARANCE UR: CLEAR
AST SERPL-CCNC: 23 U/L (ref 15–37)
BASOPHILS # BLD: 0 K/UL (ref 0–0.1)
BASOPHILS NFR BLD: 0 % (ref 0–2)
BILIRUB SERPL-MCNC: 0.3 MG/DL (ref 0.2–1)
BILIRUB UR QL: NEGATIVE
BUN SERPL-MCNC: 14 MG/DL (ref 7–18)
BUN/CREAT SERPL: 15 (ref 12–20)
CALCIUM SERPL-MCNC: 8.6 MG/DL (ref 8.5–10.1)
CHLORIDE SERPL-SCNC: 108 MMOL/L (ref 100–108)
CO2 SERPL-SCNC: 27 MMOL/L (ref 21–32)
COLOR UR: YELLOW
CREAT SERPL-MCNC: 0.95 MG/DL (ref 0.6–1.3)
DIFFERENTIAL METHOD BLD: NORMAL
EOSINOPHIL # BLD: 0.2 K/UL (ref 0–0.4)
EOSINOPHIL NFR BLD: 3 % (ref 0–5)
ERYTHROCYTE [DISTWIDTH] IN BLOOD BY AUTOMATED COUNT: 12.4 % (ref 11.6–14.5)
GLOBULIN SER CALC-MCNC: 2.5 G/DL (ref 2–4)
GLUCOSE SERPL-MCNC: 106 MG/DL (ref 74–99)
GLUCOSE UR STRIP.AUTO-MCNC: NEGATIVE MG/DL
HCT VFR BLD AUTO: 41.8 % (ref 36–48)
HGB BLD-MCNC: 14.3 G/DL (ref 13–16)
HGB UR QL STRIP: NEGATIVE
KETONES UR QL STRIP.AUTO: NEGATIVE MG/DL
LEUKOCYTE ESTERASE UR QL STRIP.AUTO: NEGATIVE
LIPASE SERPL-CCNC: 169 U/L (ref 73–393)
LYMPHOCYTES # BLD: 1.8 K/UL (ref 0.9–3.6)
LYMPHOCYTES NFR BLD: 39 % (ref 21–52)
MCH RBC QN AUTO: 29.2 PG (ref 24–34)
MCHC RBC AUTO-ENTMCNC: 34.2 G/DL (ref 31–37)
MCV RBC AUTO: 85.3 FL (ref 74–97)
MONOCYTES # BLD: 0.4 K/UL (ref 0.05–1.2)
MONOCYTES NFR BLD: 7 % (ref 3–10)
NEUTS SEG # BLD: 2.4 K/UL (ref 1.8–8)
NEUTS SEG NFR BLD: 51 % (ref 40–73)
NITRITE UR QL STRIP.AUTO: NEGATIVE
PH UR STRIP: 7 [PH] (ref 5–8)
PLATELET # BLD AUTO: 194 K/UL (ref 135–420)
PMV BLD AUTO: 10 FL (ref 9.2–11.8)
POTASSIUM SERPL-SCNC: 3.7 MMOL/L (ref 3.5–5.5)
PROT SERPL-MCNC: 6.2 G/DL (ref 6.4–8.2)
PROT UR STRIP-MCNC: NEGATIVE MG/DL
RBC # BLD AUTO: 4.9 M/UL (ref 4.7–5.5)
SODIUM SERPL-SCNC: 143 MMOL/L (ref 136–145)
SP GR UR REFRACTOMETRY: 1.01 (ref 1–1.03)
UROBILINOGEN UR QL STRIP.AUTO: 0.2 EU/DL (ref 0.2–1)
WBC # BLD AUTO: 4.7 K/UL (ref 4.6–13.2)

## 2019-04-26 PROCEDURE — 76705 ECHO EXAM OF ABDOMEN: CPT

## 2019-04-26 PROCEDURE — 96361 HYDRATE IV INFUSION ADD-ON: CPT

## 2019-04-26 PROCEDURE — 99284 EMERGENCY DEPT VISIT MOD MDM: CPT

## 2019-04-26 PROCEDURE — 80053 COMPREHEN METABOLIC PANEL: CPT

## 2019-04-26 PROCEDURE — C9113 INJ PANTOPRAZOLE SODIUM, VIA: HCPCS | Performed by: EMERGENCY MEDICINE

## 2019-04-26 PROCEDURE — 74011250637 HC RX REV CODE- 250/637: Performed by: EMERGENCY MEDICINE

## 2019-04-26 PROCEDURE — 85025 COMPLETE CBC W/AUTO DIFF WBC: CPT

## 2019-04-26 PROCEDURE — 74011000250 HC RX REV CODE- 250: Performed by: EMERGENCY MEDICINE

## 2019-04-26 PROCEDURE — 81003 URINALYSIS AUTO W/O SCOPE: CPT

## 2019-04-26 PROCEDURE — 74022 RADEX COMPL AQT ABD SERIES: CPT

## 2019-04-26 PROCEDURE — 74011250636 HC RX REV CODE- 250/636: Performed by: EMERGENCY MEDICINE

## 2019-04-26 PROCEDURE — 83690 ASSAY OF LIPASE: CPT

## 2019-04-26 PROCEDURE — 96374 THER/PROPH/DIAG INJ IV PUSH: CPT

## 2019-04-26 PROCEDURE — 93005 ELECTROCARDIOGRAM TRACING: CPT

## 2019-04-26 PROCEDURE — 96375 TX/PRO/DX INJ NEW DRUG ADDON: CPT

## 2019-04-26 RX ORDER — PANTOPRAZOLE SODIUM 40 MG/10ML
40 INJECTION, POWDER, LYOPHILIZED, FOR SOLUTION INTRAVENOUS
Status: COMPLETED | OUTPATIENT
Start: 2019-04-26 | End: 2019-04-26

## 2019-04-26 RX ORDER — FENTANYL CITRATE 50 UG/ML
100 INJECTION, SOLUTION INTRAMUSCULAR; INTRAVENOUS ONCE
Status: COMPLETED | OUTPATIENT
Start: 2019-04-26 | End: 2019-04-26

## 2019-04-26 RX ORDER — ONDANSETRON 4 MG/1
4 TABLET, ORALLY DISINTEGRATING ORAL
Qty: 9 TAB | Refills: 0 | Status: SHIPPED | OUTPATIENT
Start: 2019-04-26 | End: 2019-09-22

## 2019-04-26 RX ORDER — PANTOPRAZOLE SODIUM 40 MG/1
40 TABLET, DELAYED RELEASE ORAL DAILY
Qty: 20 TAB | Refills: 0 | Status: SHIPPED | OUTPATIENT
Start: 2019-04-26 | End: 2019-05-16

## 2019-04-26 RX ORDER — SUCRALFATE 1 G/10ML
1 SUSPENSION ORAL 4 TIMES DAILY
Qty: 400 ML | Refills: 0 | Status: SHIPPED | OUTPATIENT
Start: 2019-04-26 | End: 2019-09-22

## 2019-04-26 RX ORDER — HYDROCODONE BITARTRATE AND ACETAMINOPHEN 5; 325 MG/1; MG/1
1 TABLET ORAL
Qty: 20 TAB | Refills: 0 | Status: SHIPPED | OUTPATIENT
Start: 2019-04-26 | End: 2019-04-29

## 2019-04-26 RX ORDER — ONDANSETRON 2 MG/ML
4 INJECTION INTRAMUSCULAR; INTRAVENOUS ONCE
Status: COMPLETED | OUTPATIENT
Start: 2019-04-26 | End: 2019-04-26

## 2019-04-26 RX ADMIN — FENTANYL CITRATE 100 MCG: 50 INJECTION, SOLUTION INTRAMUSCULAR; INTRAVENOUS at 14:30

## 2019-04-26 RX ADMIN — PANTOPRAZOLE SODIUM 40 MG: 40 INJECTION, POWDER, FOR SOLUTION INTRAVENOUS at 14:26

## 2019-04-26 RX ADMIN — LIDOCAINE HYDROCHLORIDE 40 ML: 20 SOLUTION ORAL; TOPICAL at 15:11

## 2019-04-26 RX ADMIN — SODIUM CHLORIDE 1000 ML: 900 INJECTION, SOLUTION INTRAVENOUS at 14:27

## 2019-04-26 RX ADMIN — ONDANSETRON 4 MG: 2 INJECTION INTRAMUSCULAR; INTRAVENOUS at 14:29

## 2019-04-26 NOTE — ED NOTES
Bedside and Verbal shift change report given to DILEEP English (oncoming nurse) by Iglesia Ricardo RN 
 (offgoing nurse). Report included the following information SBAR, Kardex, ED Summary and Recent Results.

## 2019-04-26 NOTE — DISCHARGE INSTRUCTIONS
Abdominal Pain: Care Instructions  Your Care Instructions    Abdominal pain has many possible causes. Some aren't serious and get better on their own in a few days. Others need more testing and treatment. If your pain continues or gets worse, you need to be rechecked and may need more tests to find out what is wrong. You may need surgery to correct the problem. Don't ignore new symptoms, such as fever, nausea and vomiting, urination problems, pain that gets worse, and dizziness. These may be signs of a more serious problem. Your doctor may have recommended a follow-up visit in the next 8 to 12 hours. If you are not getting better, you may need more tests or treatment. The doctor has checked you carefully, but problems can develop later. If you notice any problems or new symptoms, get medical treatment right away. Follow-up care is a key part of your treatment and safety. Be sure to make and go to all appointments, and call your doctor if you are having problems. It's also a good idea to know your test results and keep a list of the medicines you take. How can you care for yourself at home? · Rest until you feel better. · To prevent dehydration, drink plenty of fluids, enough so that your urine is light yellow or clear like water. Choose water and other caffeine-free clear liquids until you feel better. If you have kidney, heart, or liver disease and have to limit fluids, talk with your doctor before you increase the amount of fluids you drink. · If your stomach is upset, eat mild foods, such as rice, dry toast or crackers, bananas, and applesauce. Try eating several small meals instead of two or three large ones. · Wait until 48 hours after all symptoms have gone away before you have spicy foods, alcohol, and drinks that contain caffeine. · Do not eat foods that are high in fat. · Avoid anti-inflammatory medicines such as aspirin, ibuprofen (Advil, Motrin), and naproxen (Aleve).  These can cause stomach upset. Talk to your doctor if you take daily aspirin for another health problem. When should you call for help? Call 911 anytime you think you may need emergency care. For example, call if:    · You passed out (lost consciousness).     · You pass maroon or very bloody stools.     · You vomit blood or what looks like coffee grounds.     · You have new, severe belly pain.    Call your doctor now or seek immediate medical care if:    · Your pain gets worse, especially if it becomes focused in one area of your belly.     · You have a new or higher fever.     · Your stools are black and look like tar, or they have streaks of blood.     · You have unexpected vaginal bleeding.     · You have symptoms of a urinary tract infection. These may include:  ? Pain when you urinate. ? Urinating more often than usual.  ? Blood in your urine.     · You are dizzy or lightheaded, or you feel like you may faint.    Watch closely for changes in your health, and be sure to contact your doctor if:    · You are not getting better after 1 day (24 hours). Where can you learn more? Go to http://edna-ronaldo.info/. Enter H621 in the search box to learn more about \"Abdominal Pain: Care Instructions. \"  Current as of: September 23, 2018  Content Version: 11.9  © 0065-3340 [employer] Care instructions adapted under license by [employer] (which disclaims liability or warranty for this information). If you have questions about a medical condition or this instruction, always ask your healthcare professional. [place] [address] any warranty or liability for your use of this information. Indigestion (Dyspepsia or Heartburn): Care Instructions  Your Care Instructions  Sometimes it can be hard to pinpoint the cause of indigestion.  (It is also called dyspepsia or heartburn.) Most cases of an upset stomach with bloating, burning, burping, and nausea are minor and go away within several hours. Home treatment and over-the-counter medicine often are able to control symptoms. But if you take medicine to relieve your indigestion without making diet and lifestyle changes, your symptoms are likely to return again and again. If you get indigestion often, it may be a sign of a more serious medical problem. Be sure to follow up with your doctor, who may want to do tests to be sure of the cause of your indigestion. Follow-up care is a key part of your treatment and safety. Be sure to make and go to all appointments, and call your doctor if you are having problems. It's also a good idea to know your test results and keep a list of the medicines you take. How can you care for yourself at home? · Your doctor may recommend over-the-counter medicine. For mild or occasional indigestion, antacids such as Gaviscon, Mylanta, Maalox, or Tums, may help. Be safe with medicines. Be careful when you take over-the-counter antacid medicines. Many of these medicines have aspirin in them. Read the label to make sure that you are not taking more than the recommended dose. Too much aspirin can be harmful. · Your doctor also may recommend over-the-counter acid reducers, such as Pepcid AC, Tagamet HB, Zantac 75, or Prilosec. Read and follow all instructions on the label. If you use these medicines often, talk with your doctor. · Change your eating habits. ? It's best to eat several small meals instead of two or three large meals. ? After you eat, wait 2 to 3 hours before you lie down. ? Chocolate, mint, and alcohol can make GERD worse. ? Spicy foods, foods that have a lot of acid (like tomatoes and oranges), and coffee can make GERD symptoms worse in some people. If your symptoms are worse after you eat a certain food, you may want to stop eating that food to see if your symptoms get better. · Do not smoke or chew tobacco. Smoking can make GERD worse.  If you need help quitting, talk to your doctor about stop-smoking programs and medicines. These can increase your chances of quitting for good. · If you have GERD symptoms at night, raise the head of your bed 6 to 8 inches. You can do this by putting the frame on blocks or placing a foam wedge under the head of your mattress. (Adding extra pillows does not work.)  · Do not wear tight clothing around your middle. · Lose weight if you need to. Losing just 5 to 10 pounds can help. · Do not take anti-inflammatory medicines, such as aspirin, ibuprofen (Advil, Motrin), or naproxen (Aleve). These can irritate the stomach. If you need a pain medicine, try acetaminophen (Tylenol), which does not cause stomach upset. When should you call for help? Call your doctor now or seek immediate medical care if:    · You have new or worse belly pain.     · You are vomiting.    Watch closely for changes in your health, and be sure to contact your doctor if:    · You have new or worse symptoms of indigestion.     · You have trouble or pain swallowing.     · You are losing weight.     · You do not get better as expected. Where can you learn more? Go to http://edna-ronaldo.info/. Enter N684 in the search box to learn more about \"Indigestion (Dyspepsia or Heartburn): Care Instructions. \"  Current as of: March 27, 2018  Content Version: 11.9  © 0912-7310 [employer]. Care instructions adapted under license by [facility] (which disclaims liability or warranty for this information). If you have questions about a medical condition or this instruction, always ask your healthcare professional. [place] [address] any warranty or liability for your use of this information. Learning About Benefits From Quitting Smoking  How does quitting smoking make you healthier? If you're thinking about quitting smoking, you may have a few reasons to be smoke-free. Your health may be one of them.   · When you quit smoking, you lower your risks for cancer, lung disease, heart attack, stroke, blood vessel disease, and blindness from macular degeneration. · When you're smoke-free, you get sick less often, and you heal faster. You are less likely to get colds, flu, bronchitis, and pneumonia. · As a nonsmoker, you may find that your mood is better and you are less stressed. When and how will you feel healthier? Quitting has real health benefits that start from day 1 of being smoke-free. And the longer you stay smoke-free, the healthier you get and the better you feel. The first hours  · After just 20 minutes, your blood pressure and heart rate go down. That means there's less stress on your heart and blood vessels. · Within 12 hours, the level of carbon monoxide in your blood drops back to normal. That makes room for more oxygen. With more oxygen in your body, you may notice that you have more energy than when you smoked. After 2 weeks  · Your lungs start to work better. · Your risk of heart attack starts to drop. After 1 month  · When your lungs are clear, you cough less and breathe deeper, so it's easier to be active. · Your sense of taste and smell return. That means you can enjoy food more than you have since you started smoking. Over the years  · After 1 year, your risk of heart disease is half what it would be if you kept smoking. · After 5 years, your risk of stroke starts to shrink. Within a few years after that, it's about the same as if you'd never smoked. · After 10 years, your risk of dying from lung cancer is cut by about half. And your risk for many other types of cancer is lower too. How would quitting help others in your life? When you quit smoking, you improve the health of everyone who now breathes in your smoke. · Their heart, lung, and cancer risks drop, much like yours. · They are sick less.  For babies and small children, living smoke-free means they're less likely to have ear infections, pneumonia, and bronchitis. · If you're a woman who is or will be pregnant someday, quitting smoking means a healthier . · Children who are close to you are less likely to become adult smokers. Where can you learn more? Go to http://edna-ronaldo.info/. Enter [id number] [id number] [id number] in the search box to learn more about \"Learning About Benefits From Quitting Smoking. \"  Current as of: 2018  Content Version: 11.9  © 0231-9298 [email or web address]. Care instructions adapted under license by [employer] (which disclaims liability or warranty for this information). If you have questions about a medical condition or this instruction, always ask your healthcare professional. [place] [address] any warranty or liability for your use of this information. Stopping Smoking: Care Instructions  Your Care Instructions  Cigarette smokers crave the nicotine in cigarettes. Giving it up is much harder than simply changing a habit. Your body has to stop craving the nicotine. It is hard to quit, but you can do it. There are many tools that people use to quit smoking. You may find that combining tools works best for you. There are several steps to quitting. First you get ready to quit. Then you get support to help you. After that, you learn new skills and behaviors to become a nonsmoker. For many people, a necessary step is getting and using medicine. Your doctor will help you set up the plan that best meets your needs. You may want to attend a smoking cessation program to help you quit smoking. When you choose a program, look for one that has proven success. Ask your doctor for ideas. You will greatly increase your chances of success if you take medicine as well as get counseling or join a cessation program.  Some of the changes you feel when you first quit tobacco are uncomfortable. Your body will miss the nicotine at first, and you may feel short-tempered and grumpy.  You may have trouble sleeping or concentrating. Medicine can help you deal with these symptoms. You may struggle with changing your smoking habits and rituals. The last step is the tricky one: Be prepared for the smoking urge to continue for a time. This is a lot to deal with, but keep at it. You will feel better. Follow-up care is a key part of your treatment and safety. Be sure to make and go to all appointments, and call your doctor if you are having problems. It's also a good idea to know your test results and keep a list of the medicines you take. How can you care for yourself at home? · Ask your family, friends, and coworkers for support. You have a better chance of quitting if you have help and support. · Join a support group, such as Nicotine Anonymous, for people who are trying to quit smoking. · Consider signing up for a smoking cessation program, such as the American Lung Association's Freedom from Smoking program.  · Get text messaging support. Go to the website at www.smokefree. gov to sign up for the [facility] program.  · Set a quit date. Pick your date carefully so that it is not right in the middle of a big deadline or stressful time. Once you quit, do not even take a puff. Get rid of all ashtrays and lighters after your last cigarette. Clean your house and your clothes so that they do not smell of smoke. · Learn how to be a nonsmoker. Think about ways you can avoid those things that make you reach for a cigarette. ? Avoid situations that put you at greatest risk for smoking. For some people, it is hard to have a drink with friends without smoking. For others, they might skip a coffee break with coworkers who smoke. ? Change your daily routine. Take a different route to work or eat a meal in a different place. · Cut down on stress. Calm yourself or release tension by doing an activity you enjoy, such as reading a book, taking a hot bath, or gardening.   · Talk to your doctor or pharmacist about nicotine replacement therapy, which replaces the nicotine in your body. You still get nicotine but you do not use tobacco. Nicotine replacement products help you slowly reduce the amount of nicotine you need. These products come in several forms, many of them available over-the-counter:  ? Nicotine patches  ? Nicotine gum and lozenges  ? Nicotine inhaler  · Ask your doctor about bupropion (Wellbutrin) or varenicline (Chantix), which are prescription medicines. They do not contain nicotine. They help you by reducing withdrawal symptoms, such as stress and anxiety. · Some people find hypnosis, acupuncture, and massage helpful for ending the smoking habit. · Eat a healthy diet and get regular exercise. Having healthy habits will help your body move past its craving for nicotine. · Be prepared to keep trying. Most people are not successful the first few times they try to quit. Do not get mad at yourself if you smoke again. Make a list of things you learned and think about when you want to try again, such as next week, next month, or next year. Where can you learn more? Go to http://edna-ronaldo.info/. Enter P838 in the search box to learn more about \"Stopping Smoking: Care Instructions. \"  Current as of: September 26, 2018  Content Version: 11.9  © 1154-1925 [employer]. Care instructions adapted under license by [employer] (which disclaims liability or warranty for this information). If you have questions about a medical condition or this instruction, always ask your healthcare professional. [place] [address] any warranty or liability for your use of this information.

## 2019-04-26 NOTE — LETTER
[facility] [place] 
[facility] 
[address] [id number] [phone] Work/School Note Date: 4/26/2019 To Whom It May concern: 
 
Ghada Duarte was seen and treated today in the emergency room by the following provider(s): 
Attending Provider: Destinee Savage MD.   
 
Ghada Duarte may return to work on 4/28/19. Sincerely, ROSELIA Tilley Compton

## 2019-04-26 NOTE — ED TRIAGE NOTES
Triage: pt complains of abdominal pain and back pain x 1 week. Pt seen in [facility] last week. Continues to have pain. Seen at [facility] this morning and sent to ED for further evaluation. Denies nausea and vomiting.

## 2019-04-26 NOTE — ED NOTES
EMERGENCY DEPARTMENT HISTORY AND PHYSICAL EXAM 
 
Date: 4/26/2019 Patient Name: Honorio Friedman History of Presenting Illness Chief Complaint Patient presents with  Abdominal Pain  
  x 1 week; no nausea or vomiting History Provided By: Patient Chief Complaint: Epigastric pain rating to his back Duration: Days Timing:  Progressive Location: epigastric Quality: Ronna Rist Severity: Moderate Modifying Factors: movement Associated Symptoms: denies any other associated signs or symptoms Additional History (Context):  
1:24 PM 
Honorio Friedman is a 29 y.o. male with PMHX of  who presents to the emergency department C/O epigastric pain. Associated sxs include back pain. Pt denies CP or SOB, and any other sxs or complaints. The patient was treated here a week ago diagnosed with pancreatitis. Over the past week, he has had progressive abdominal pain associated with nausea, no vomiting. He notes his pain is worse with movement he notes no aggravation with eating. With eating he has diarrhea. PCP: Mookie Lloyd MD 
 
Current Outpatient Medications Medication Sig Dispense Refill  
 HYDROcodone-acetaminophen (NORCO) 5-325 mg per tablet Take 1 Tab by mouth every six (6) hours as needed for Pain for up to 3 days. Max Daily Amount: 4 Tabs. 20 Tab 0  
 pantoprazole (PROTONIX) 40 mg tablet Take 1 Tab by mouth daily for 20 days. 20 Tab 0  
 sucralfate (CARAFATE) 100 mg/mL suspension Take 10 mL by mouth four (4) times daily. 400 mL 0  
 ondansetron (ZOFRAN ODT) 4 mg disintegrating tablet Take 1 Tab by mouth every eight (8) hours as needed for Nausea. 9 Tab 0  
 pantoprazole (PROTONIX) 40 mg tablet Take 1 Tab by mouth daily for 20 days. 20 Tab 0 Past History Past Medical History: 
History reviewed. No pertinent past medical history. Past Surgical History: 
History reviewed. No pertinent surgical history. Family History: 
History reviewed. No pertinent family history. Social History: 
Social History Tobacco Use  Smoking status: Current Some Day Smoker Packs/day: 0.25  Smokeless tobacco: Never Used Substance Use Topics  Alcohol use: Yes Comment: celebrations  Drug use: No  
 
 
Allergies: Allergies Allergen Reactions  Aleve [Naproxen Sodium] Swelling  Ibuprofen Other (comments) Sweats Review of Systems Review of Systems Constitutional: Negative for chills and fever. HENT: Negative for congestion and sore throat. Respiratory: Negative for cough and shortness of breath. Cardiovascular: Negative for chest pain and palpitations. Gastrointestinal: Positive for abdominal pain, diarrhea and nausea. Negative for vomiting. Endocrine: Negative for polydipsia and polyuria. Genitourinary: Negative for dysuria, flank pain and hematuria. Musculoskeletal: Positive for back pain. Negative for neck pain. Skin: Negative for color change and rash. Neurological: Negative for weakness, light-headedness and headaches. All other systems reviewed and are negative. Physical Exam  
 
Vitals:  
 04/26/19 1425 04/26/19 1430 04/26/19 1600 04/26/19 1630 BP: 103/62 100/87 103/65 115/75 Pulse:      
Resp:      
Temp:      
SpO2: 100% 100% 100% Weight:      
Height:      
 
Physical Exam  
Constitutional: He is oriented to person, place, and time. He appears well-developed and well-nourished. HENT:  
Head: Normocephalic and atraumatic. Right Ear: External ear normal.  
Left Ear: External ear normal.  
Nose: Nose normal.  
Mouth/Throat: Oropharynx is clear and moist.  
Eyes: Pupils are equal, round, and reactive to light. Conjunctivae and EOM are normal.  
Neck: Normal range of motion. Neck supple. No JVD present. No tracheal deviation present. No thyromegaly present. Cardiovascular: Normal rate, regular rhythm, normal heart sounds and intact distal pulses. Exam reveals no gallop and no friction rub. No murmur heard. Pulmonary/Chest: Effort normal and breath sounds normal. No respiratory distress. He has no wheezes. He has no rales. Abdominal: Soft. Bowel sounds are normal. He exhibits no distension and no mass. There is tenderness. There is no rebound and no guarding. Epigastric tendernes Musculoskeletal: Normal range of motion. Neurological: He is alert and oriented to person, place, and time. He has normal reflexes. No cranial nerve deficit. Skin: Skin is warm and dry. No rash noted. Psychiatric: He has a normal mood and affect. His behavior is normal.  
Nursing note and vitals reviewed. Diagnostic Study Results Labs - Recent Results (from the past 24 hour(s)) CBC WITH AUTOMATED DIFF Collection Time: 04/26/19  1:33 PM  
Result Value Ref Range WBC 4.7 4.6 - 13.2 K/uL  
 RBC 4.90 4.70 - 5.50 M/uL  
 HGB 14.3 13.0 - 16.0 g/dL HCT 41.8 36.0 - 48.0 % MCV 85.3 74.0 - 97.0 FL  
 MCH 29.2 24.0 - 34.0 PG  
 MCHC 34.2 31.0 - 37.0 g/dL  
 RDW 12.4 11.6 - 14.5 % PLATELET 107 164 - 116 K/uL MPV 10.0 9.2 - 11.8 FL  
 NEUTROPHILS 51 40 - 73 % LYMPHOCYTES 39 21 - 52 % MONOCYTES 7 3 - 10 % EOSINOPHILS 3 0 - 5 % BASOPHILS 0 0 - 2 %  
 ABS. NEUTROPHILS 2.4 1.8 - 8.0 K/UL  
 ABS. LYMPHOCYTES 1.8 0.9 - 3.6 K/UL  
 ABS. MONOCYTES 0.4 0.05 - 1.2 K/UL  
 ABS. EOSINOPHILS 0.2 0.0 - 0.4 K/UL  
 ABS. BASOPHILS 0.0 0.0 - 0.1 K/UL  
 DF AUTOMATED METABOLIC PANEL, COMPREHENSIVE Collection Time: 04/26/19  1:33 PM  
Result Value Ref Range Sodium 143 136 - 145 mmol/L Potassium 3.7 3.5 - 5.5 mmol/L Chloride 108 100 - 108 mmol/L  
 CO2 27 21 - 32 mmol/L Anion gap 8 3.0 - 18 mmol/L Glucose 106 (H) 74 - 99 mg/dL BUN 14 7.0 - 18 MG/DL Creatinine 0.95 0.6 - 1.3 MG/DL  
 BUN/Creatinine ratio 15 12 - 20 GFR est AA >60 >60 ml/min/1.73m2 GFR est non-AA >60 >60 ml/min/1.73m2 Calcium 8.6 8.5 - 10.1 MG/DL  Bilirubin, total 0.3 0.2 - 1.0 MG/DL  
 ALT (SGPT) 39 16 - 61 U/L  
 AST (SGOT) 23 15 - 37 U/L Alk. phosphatase 73 45 - 117 U/L Protein, total 6.2 (L) 6.4 - 8.2 g/dL Albumin 3.7 3.4 - 5.0 g/dL Globulin 2.5 2.0 - 4.0 g/dL A-G Ratio 1.5 0.8 - 1.7 LIPASE Collection Time: 04/26/19  1:33 PM  
Result Value Ref Range Lipase 169 73 - 393 U/L  
EKG, 12 LEAD, INITIAL Collection Time: 04/26/19  2:44 PM  
Result Value Ref Range Ventricular Rate 69 BPM  
 Atrial Rate 69 BPM  
 P-R Interval 158 ms QRS Duration 84 ms Q-T Interval 382 ms QTC Calculation (Bezet) 409 ms Calculated P Axis 68 degrees Calculated R Axis 84 degrees Calculated T Axis 50 degrees Diagnosis Normal sinus rhythm Anterior infarct , age undetermined Abnormal ECG No previous ECGs available URINALYSIS W/ RFLX MICROSCOPIC Collection Time: 04/26/19  4:38 PM  
Result Value Ref Range Color YELLOW Appearance CLEAR Specific gravity 1.009 1.005 - 1.030    
 pH (UA) 7.0 5.0 - 8.0 Protein NEGATIVE  NEG mg/dL Glucose NEGATIVE  NEG mg/dL Ketone NEGATIVE  NEG mg/dL Bilirubin NEGATIVE  NEG Blood NEGATIVE  NEG Urobilinogen 0.2 0.2 - 1.0 EU/dL Nitrites NEGATIVE  NEG Leukocyte Esterase NEGATIVE  NEG Radiologic Studies - 
XR ABD ACUTE W 1 V CHEST Final Result Impression: 1. Nonobstructive and nonspecific bowel gas pattern. 2. No acute radiographic cardiopulmonary abnormality. US PANCREAS Final Result IMPRESSION:  
  
  
1. Mildly increased hepatic parenchymal echogenicity without evidence of focal  
hepatic lesion. 2. Normal sonographic appearance to the visualized pancreas. No peripancreatic  
fluid collection. CT Results  (Last 48 hours) None CXR Results  (Last 48 hours) None Medications given in the ED- Medications  
sodium chloride 0.9 % bolus infusion 1,000 mL (0 mL IntraVENous IV Completed 4/26/19 6103) pantoprazole (PROTONIX) injection 40 mg (40 mg IntraVENous Given 4/26/19 1426) ondansetron [facility]) injection 4 mg (4 mg IntraVENous Given 4/26/19 1429)  
fentaNYL citrate (PF) injection 100 mcg (100 mcg IntraVENous Given 4/26/19 1430)  
mylanta/viscous lidocaine (GI COCKTAIL) (40 mL Oral Given 4/26/19 1511) Medical Decision Making I am the first provider for this patient. I reviewed the vital signs, available nursing notes, past medical history, past surgical history, family history and social history. Vital Signs-Reviewed the patient's vital signs. Pulse Oximetry Analysis - 100% on RA Cardiac Monitor: 
Rate: 69 bpm 
Rhythm: NSR 
 
EKG interpretation: (Preliminary) 14:44 ECG 
NSR @ 69 with normal interval and normal axis and no ST-T wave changes suggestive of ischemia. There is poor R wave progression ECG read by Mona Simmons MD  
 
Records Reviewed: Nursing Notes Provider Notes (Medical Decision Making): DDx- GERD, pancreatitis, dehydration Procedures: 
Procedures ED Course:  
Initial assessment performed. The patients presenting problems have been discussed, and they are in agreement with the care plan formulated and outlined with them. I have encouraged them to ask questions as they arise throughout their visit. 17:08 Patient feels better with complete resolution of his abdominal pain. To follow-up with his PCP and gastroneurology for further evaluation. Patient is advised to have a low-fat diet stop smoking and take medications as prescribed. If the patient develops recurrent pain vomiting fevers he is to return to the emergency department. Diagnosis and Disposition DISCUSSION: 
Patient was stable in the ED improved after IV NS hydration, Protonix, Zofran, fentanyl and GI cocktail with resolution of epigastric pain. ECG showed no acute injury pattern. Labs were unremarkable.  Abdominal series were unremarkable. Abdominal ultrasound was unremarkable. The patient will follow-up with PCP and gastroenterology evaluation. Patient advised to return to the ED if he develops recurrent pain, vomiting, fevers or SOB. Patient advised to stop smoking cigarettes. DISCHARGE NOTE: 
Oliva Duke  results have been reviewed with him. He has been counseled regarding his diagnosis, treatment, and plan. He verbally conveys understanding and agreement of the signs, symptoms, diagnosis, treatment and prognosis and additionally agrees to follow up as discussed. He also agrees with the care-plan and conveys that all of his questions have been answered. I have also provided discharge instructions for him that include: educational information regarding their diagnosis and treatment, and list of reasons why they would want to return to the ED prior to their follow-up appointment, should his condition change. He has been provided with education for proper emergency department utilization. CLINICAL IMPRESSION: 
 
1. Abdominal pain, epigastric 2. Tobacco use disorder PLAN: 
1. D/C Home 2. Discharge Medication List as of 4/26/2019  6:05 PM  
  
START taking these medications Details HYDROcodone-acetaminophen (NORCO) 5-325 mg per tablet Take 1 Tab by mouth every six (6) hours as needed for Pain for up to 3 days. Max Daily Amount: 4 Tabs., Print, Disp-20 Tab, R-0  
  
!! pantoprazole (PROTONIX) 40 mg tablet Take 1 Tab by mouth daily for 20 days. , Print, Disp-20 Tab, R-0  
  
sucralfate (CARAFATE) 100 mg/mL suspension Take 10 mL by mouth four (4) times daily. , Print, Disp-400 mL, R-0  
  
ondansetron (ZOFRAN ODT) 4 mg disintegrating tablet Take 1 Tab by mouth every eight (8) hours as needed for Nausea. , Print, Disp-9 Tab, R-0  
  
 !! - Potential duplicate medications found. Please discuss with provider. CONTINUE these medications which have NOT CHANGED Details !! pantoprazole (PROTONIX) 40 mg tablet Take 1 Tab by mouth daily for 20 days. , Normal, Disp-20 Tab, R-0  
  
dicyclomine (BENTYL) 10 mg capsule Take 1 Cap by mouth four (4) times daily for 5 days. , Normal, Disp-20 Cap, R-0  
  
 !! - Potential duplicate medications found. Please discuss with provider. 3.  
Follow-up Information Follow up With Specialties Details Why Contact Info Farrah Stahl MD Gastroenterology Schedule an appointment as soon as possible for a visit in 3 days For further evaluation [address] [facility] [address] [place] [address] 
[phone] [facility]  Schedule an appointment as soon as possible for a visit in 3 days For further evaluation  [address] [place], [address] [facility] [address] 
[phone] Please note that this dictation was completed with [employer], the computer voice recognition software. Quite often unanticipated grammatical, syntax, homophones, and other interpretive errors are inadvertently transcribed by the computer software. Please disregard these errors. Please excuse any errors that have escaped final proofreading.

## 2019-04-26 NOTE — ED NOTES
Patient given copy of dc instructions and 3 script(s). Patient verbalized understanding of instructions and script (s). Patient given a current medication reconciliation form and verbalized understanding of their medications. Patient verbalized understanding of the importance of discussing medications with  his or her physician or clinic they will be following up with. Patient alert and oriented and in no acute distress. Patient discharged home ambulatory with self.

## 2019-05-12 LAB
ATRIAL RATE: 69 BPM
CALCULATED P AXIS, ECG09: 68 DEGREES
CALCULATED R AXIS, ECG10: 84 DEGREES
CALCULATED T AXIS, ECG11: 50 DEGREES
DIAGNOSIS, 93000: NORMAL
P-R INTERVAL, ECG05: 158 MS
Q-T INTERVAL, ECG07: 382 MS
QRS DURATION, ECG06: 84 MS
QTC CALCULATION (BEZET), ECG08: 409 MS
VENTRICULAR RATE, ECG03: 69 BPM

## 2019-09-21 ENCOUNTER — HOSPITAL ENCOUNTER (EMERGENCY)
Age: 29
Discharge: LEFT BEFORE DISCHARGE | End: 2019-09-22
Attending: EMERGENCY MEDICINE
Payer: MEDICAID

## 2019-09-21 VITALS
SYSTOLIC BLOOD PRESSURE: 131 MMHG | OXYGEN SATURATION: 100 % | HEART RATE: 95 BPM | WEIGHT: 130 LBS | RESPIRATION RATE: 18 BRPM | BODY MASS INDEX: 21.63 KG/M2 | DIASTOLIC BLOOD PRESSURE: 64 MMHG | TEMPERATURE: 98.1 F

## 2019-09-21 DIAGNOSIS — Z53.21 PATIENT LEFT AFTER TRIAGE: Primary | ICD-10-CM

## 2019-09-21 PROCEDURE — 75810000275 HC EMERGENCY DEPT VISIT NO LEVEL OF CARE

## 2019-09-22 ENCOUNTER — HOSPITAL ENCOUNTER (EMERGENCY)
Age: 29
Discharge: HOME | End: 2019-09-22
Attending: EMERGENCY MEDICINE
Payer: MEDICAID

## 2019-09-22 VITALS
TEMPERATURE: 97.7 F | BODY MASS INDEX: 21.66 KG/M2 | RESPIRATION RATE: 20 BRPM | HEIGHT: 65 IN | DIASTOLIC BLOOD PRESSURE: 70 MMHG | OXYGEN SATURATION: 100 % | SYSTOLIC BLOOD PRESSURE: 127 MMHG | WEIGHT: 130 LBS | HEART RATE: 73 BPM

## 2019-09-22 DIAGNOSIS — K13.79 ORAL PAIN: ICD-10-CM

## 2019-09-22 DIAGNOSIS — S00.512A ABRASION OF BUCCAL MUCOSA, INITIAL ENCOUNTER: ICD-10-CM
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
## 2019-09-22 PROCEDURE — 99282 EMERGENCY DEPT VISIT SF MDM: CPT

## 2019-09-22 RX ORDER — LIDOCAINE HYDROCHLORIDE 20 MG/ML
15 SOLUTION OROPHARYNGEAL AS NEEDED
Qty: 1 BOTTLE | Refills: 0 | Status: SHIPPED | OUTPATIENT
Start: 2019-09-22 | End: 2020-05-26

## 2019-09-22 RX ORDER — AMOXICILLIN AND CLAVULANATE POTASSIUM 500; 125 MG/1; MG/1
1 TABLET, FILM COATED ORAL 2 TIMES DAILY
Qty: 14 TAB | Refills: 0 | Status: SHIPPED | OUTPATIENT
Start: 2019-09-22 | End: 2019-09-29
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
## 2019-09-23 NOTE — DISCHARGE INSTRUCTIONS
Patient Education        Scrapes (Abrasions): Care Instructions  Your Care Instructions  Scrapes (abrasions) are wounds where your skin has been rubbed or torn off. Most scrapes do not go deep into the skin, but some may remove several layers of skin. Scrapes usually don't bleed much, but they may ooze pinkish fluid. Scrapes on the head or face may appear worse than they are. They may bleed a lot because of the good blood supply to this area. Most scrapes heal well and may not need a bandage. They usually heal within 3 to 7 days. A large, deep scrape may take 1 to 2 weeks or longer to heal. A scab may form on some scrapes. Follow-up care is a key part of your treatment and safety. Be sure to make and go to all appointments, and call your doctor if you are having problems. It's also a good idea to know your test results and keep a list of the medicines you take. How can you care for yourself at home? · If your doctor told you how to care for your wound, follow your doctor's instructions. If you did not get instructions, follow this general advice:  ? Wash the scrape with clean water 2 times a day. Don't use hydrogen peroxide or alcohol, which can slow healing. ? You may cover the scrape with a thin layer of petroleum jelly, such as Vaseline, and a nonstick bandage. ? Apply more petroleum jelly and replace the bandage as needed. · Prop up the injured area on a pillow anytime you sit or lie down during the next 3 days. Try to keep it above the level of your heart. This will help reduce swelling. · Be safe with medicines. Take pain medicines exactly as directed. ? If the doctor gave you a prescription medicine for pain, take it as prescribed. ? If you are not taking a prescription pain medicine, ask your doctor if you can take an over-the-counter medicine. When should you call for help?   Call your doctor now or seek immediate medical care if:    · You have signs of infection, such as:  ? Increased pain, swelling, warmth, or redness around the scrape. ? Red streaks leading from the scrape. ? Pus draining from the scrape. ? A fever.     · The scrape starts to bleed, and blood soaks through the bandage. Oozing small amounts of blood is normal.    Watch closely for changes in your health, and be sure to contact your doctor if the scrape is not getting better each day. Where can you learn more? Go to http://edna-ronaldo.info/. Enter A374 in the search box to learn more about \"Scrapes (Abrasions): Care Instructions. \"  Current as of: June 26, 2019  Content Version: 12.2  © 4840-1122 [employer], [employer]. Care instructions adapted under license by [employer] (which disclaims liability or warranty for this information). If you have questions about a medical condition or this instruction, always ask your healthcare professional. [address] any warranty or liability for your use of this information.

## 2019-10-27 ENCOUNTER — HOSPITAL ENCOUNTER (EMERGENCY)
Age: 29
Discharge: HOME | End: 2019-10-27
Attending: EMERGENCY MEDICINE
Payer: MEDICAID

## 2019-10-27 VITALS
WEIGHT: 130 LBS | BODY MASS INDEX: 21.66 KG/M2 | RESPIRATION RATE: 18 BRPM | HEART RATE: 87 BPM | DIASTOLIC BLOOD PRESSURE: 82 MMHG | SYSTOLIC BLOOD PRESSURE: 111 MMHG | TEMPERATURE: 97.7 F | OXYGEN SATURATION: 100 % | HEIGHT: 65 IN

## 2019-10-27 DIAGNOSIS — F41.1 ANXIETY STATE: ICD-10-CM

## 2019-10-27 DIAGNOSIS — F43.20 ADJUSTMENT REACTION OF ADULT LIFE: Primary | ICD-10-CM

## 2019-10-27 LAB
ALBUMIN SERPL-MCNC: 4.5 G/DL (ref 3.4–5)
ALBUMIN/GLOB SERPL: 1.5 {RATIO} (ref 0.8–1.7)
ALP SERPL-CCNC: 81 U/L (ref 45–117)
ALT SERPL-CCNC: 46 U/L (ref 16–61)
AMPHET UR QL SCN: NEGATIVE
ANION GAP SERPL CALC-SCNC: 7 MMOL/L (ref 3–18)
APPEARANCE UR: CLEAR
AST SERPL-CCNC: 21 U/L (ref 10–38)
BARBITURATES UR QL SCN: POSITIVE
BASOPHILS # BLD: 0 K/UL (ref 0–0.1)
BASOPHILS NFR BLD: 0 % (ref 0–2)
BENZODIAZ UR QL: NEGATIVE
BILIRUB SERPL-MCNC: 0.7 MG/DL (ref 0.2–1)
BILIRUB UR QL: NEGATIVE
BUN SERPL-MCNC: 13 MG/DL (ref 7–18)
BUN/CREAT SERPL: 13 (ref 12–20)
CALCIUM SERPL-MCNC: 9 MG/DL (ref 8.5–10.1)
CANNABINOIDS UR QL SCN: POSITIVE
CHLORIDE SERPL-SCNC: 105 MMOL/L (ref 100–111)
CO2 SERPL-SCNC: 28 MMOL/L (ref 21–32)
COCAINE UR QL SCN: NEGATIVE
COLOR UR: YELLOW
CREAT SERPL-MCNC: 1.02 MG/DL (ref 0.6–1.3)
DIFFERENTIAL METHOD BLD: ABNORMAL
EOSINOPHIL # BLD: 0.2 K/UL (ref 0–0.4)
EOSINOPHIL NFR BLD: 2 % (ref 0–5)
ERYTHROCYTE [DISTWIDTH] IN BLOOD BY AUTOMATED COUNT: 12 % (ref 11.6–14.5)
ETHANOL SERPL-MCNC: <3 MG/DL (ref 0–3)
GLOBULIN SER CALC-MCNC: 3.1 G/DL (ref 2–4)
GLUCOSE SERPL-MCNC: 98 MG/DL (ref 74–99)
GLUCOSE UR STRIP.AUTO-MCNC: NEGATIVE MG/DL
HCT VFR BLD AUTO: 46.7 % (ref 36–48)
HDSCOM,HDSCOM: ABNORMAL
HGB BLD-MCNC: 16 G/DL (ref 13–16)
HGB UR QL STRIP: NEGATIVE
KETONES UR QL STRIP.AUTO: NEGATIVE MG/DL
LEUKOCYTE ESTERASE UR QL STRIP.AUTO: NEGATIVE
LYMPHOCYTES # BLD: 3.1 K/UL (ref 0.9–3.6)
LYMPHOCYTES NFR BLD: 49 % (ref 21–52)
MCH RBC QN AUTO: 28.9 PG (ref 24–34)
MCHC RBC AUTO-ENTMCNC: 34.3 G/DL (ref 31–37)
MCV RBC AUTO: 84.4 FL (ref 74–97)
METHADONE UR QL: NEGATIVE
MONOCYTES # BLD: 0.4 K/UL (ref 0.05–1.2)
MONOCYTES NFR BLD: 6 % (ref 3–10)
NEUTS SEG # BLD: 2.7 K/UL (ref 1.8–8)
NEUTS SEG NFR BLD: 43 % (ref 40–73)
NITRITE UR QL STRIP.AUTO: NEGATIVE
OPIATES UR QL: NEGATIVE
PCP UR QL: NEGATIVE
PH UR STRIP: 7.5 [PH] (ref 5–8)
PLATELET # BLD AUTO: 219 K/UL (ref 135–420)
PMV BLD AUTO: 10.1 FL (ref 9.2–11.8)
POTASSIUM SERPL-SCNC: 3.5 MMOL/L (ref 3.5–5.5)
PROT SERPL-MCNC: 7.6 G/DL (ref 6.4–8.2)
PROT UR STRIP-MCNC: NEGATIVE MG/DL
RBC # BLD AUTO: 5.53 M/UL (ref 4.7–5.5)
SODIUM SERPL-SCNC: 140 MMOL/L (ref 136–145)
SP GR UR REFRACTOMETRY: 1.02 (ref 1–1.03)
UROBILINOGEN UR QL STRIP.AUTO: 1 EU/DL (ref 0.2–1)
WBC # BLD AUTO: 6.4 K/UL (ref 4.6–13.2)

## 2019-10-27 PROCEDURE — 81003 URINALYSIS AUTO W/O SCOPE: CPT

## 2019-10-27 PROCEDURE — 99285 EMERGENCY DEPT VISIT HI MDM: CPT

## 2019-10-27 PROCEDURE — 85025 COMPLETE CBC W/AUTO DIFF WBC: CPT

## 2019-10-27 PROCEDURE — 80053 COMPREHEN METABOLIC PANEL: CPT

## 2019-10-27 PROCEDURE — 74011250637 HC RX REV CODE- 250/637: Performed by: EMERGENCY MEDICINE

## 2019-10-27 PROCEDURE — 80307 DRUG TEST PRSMV CHEM ANLYZR: CPT

## 2019-10-27 RX ORDER — HYDROXYZINE 50 MG/1
50 TABLET, FILM COATED ORAL
Qty: 20 TAB | Refills: 0 | Status: SHIPPED | OUTPATIENT
Start: 2019-10-27 | End: 2019-11-06

## 2019-10-27 RX ORDER — HYDROXYZINE 25 MG/1
25 TABLET, FILM COATED ORAL ONCE
Status: COMPLETED | OUTPATIENT
Start: 2019-10-27 | End: 2019-10-27

## 2019-10-27 RX ADMIN — HYDROXYZINE HYDROCHLORIDE 25 MG: 25 TABLET, FILM COATED ORAL at 21:57

## 2019-10-28 NOTE — ED NOTES
Pt calm and cooperative. Pt reports he is feeling better now. Pt denies SI/HI. D/C info discussed. Pt encouraged to f/u with mental health and PCP. Pt reports understanding.

## 2019-10-28 NOTE — ED NOTES
During triage screening patient states, \"I wanna kill myself, I wanna jump off that bridge down the street. \"

## 2019-10-28 NOTE — ED PROVIDER NOTES
EMERGENCY DEPARTMENT HISTORY AND PHYSICAL EXAM    Date: 10/27/2019  Patient Name: Linda King    History of Presenting Illness     Chief Complaint   Patient presents with    Anxiety    Suicidal         History Provided By: Patient    History:   9:23 PM  Linda King is a 29 y.o. male with PMHx of ADHD who presents to the emergency department with complaint of anxiety. Associated sxs include depressed mood. He reports that he feels stressed and angry because his girlfriend cheated on him and he has been having difficulty finding a job. He states that he felt like he had a panic attack. Patient stated during triage that \"I want to kill myself, I want to jump off that bridge down the street\" but states that \"I did not really mean it, I'm just saying things I don't mean. \" Patient denies suicidal ideations/homicidal ideations any other sxs or complaints. He endorses marijuana use. PCP: Mookie Lloyd MD    Current Facility-Administered Medications   Medication Dose Route Frequency Provider Last Rate Last Dose    hydrOXYzine HCl (ATARAX) tablet 25 mg  25 mg Oral ONCE Alan Yu MD         Current Outpatient Medications   Medication Sig Dispense Refill    lisdexamfetamine (VYVANSE) 50 mg cap Take 50 mg by mouth daily. Indications: Attention Deficit Disorder with Hyperactivity      hydrOXYzine HCl (ATARAX) 50 mg tablet Take 1 Tab by mouth every six (6) hours as needed for Itching for up to 10 days. 20 Tab 0    lidocaine (XYLOCAINE) 2 % solution Take 15 mL by mouth as needed for Pain. 1 Bottle 0       Past History     Past Medical History:  Past Medical History:   Diagnosis Date    Psychiatric disorder     ADHD       Past Surgical History:  History reviewed. No pertinent surgical history. Family History:  History reviewed. No pertinent family history.     Social History:  Social History     Tobacco Use    Smoking status: Current Some Day Smoker     Packs/day: 0.25    Smokeless tobacco: Never Used Substance Use Topics    Alcohol use: Yes     Comment: celebrations    Drug use: No       Allergies: Allergies   Allergen Reactions    Aleve [Naproxen Sodium] Swelling    Ibuprofen Other (comments)     Sweats          Review of Systems   Review of Systems   Constitutional: Negative for chills, diaphoresis, fever and unexpected weight change. HENT: Negative for congestion, drooling, ear pain, rhinorrhea, sore throat, tinnitus and trouble swallowing. Eyes: Negative for photophobia, pain, redness and visual disturbance. Respiratory: Negative for cough, choking, chest tightness, shortness of breath, wheezing and stridor. Cardiovascular: Negative for chest pain, palpitations and leg swelling. Gastrointestinal: Negative for abdominal distention, abdominal pain, anal bleeding, blood in stool, constipation, diarrhea, nausea and vomiting. Genitourinary: Negative for difficulty urinating, dysuria, flank pain, frequency, hematuria and urgency. Musculoskeletal: Negative for arthralgias, back pain and neck pain. Skin: Negative for color change, rash and wound. Neurological: Negative for dizziness, seizures, syncope, speech difficulty, light-headedness and headaches. Hematological: Does not bruise/bleed easily. Psychiatric/Behavioral: Positive for dysphoric mood. Negative for agitation, behavioral problems, hallucinations, self-injury and suicidal ideas. The patient is nervous/anxious. The patient is not hyperactive. Physical Exam     Vitals:    10/27/19 2016   BP: 115/82   Pulse: 87   Resp: 16   Temp: 97.7 °F (36.5 °C)   SpO2: 100%   Weight: 59 kg (130 lb)   Height: 5' 5\" (1.651 m)     Physical Exam   Constitutional: He is oriented to person, place, and time. He appears well-developed and well-nourished. No distress. HENT:   Head: Normocephalic and atraumatic.    Right Ear: External ear normal.   Left Ear: External ear normal.   Mouth/Throat: Oropharynx is clear and moist. No oropharyngeal exudate. Eyes: Pupils are equal, round, and reactive to light. Conjunctivae and EOM are normal. No scleral icterus. No pallor   Neck: Normal range of motion. Neck supple. No JVD present. No tracheal deviation present. No thyromegaly present. Cardiovascular: Normal rate, regular rhythm and normal heart sounds. Pulmonary/Chest: Effort normal and breath sounds normal. No stridor. No respiratory distress. Abdominal: Soft. Bowel sounds are normal. He exhibits no distension. There is no tenderness. There is no rebound and no guarding. Musculoskeletal: Normal range of motion. He exhibits no edema or tenderness. No soft tissue injuries   Lymphadenopathy:     He has no cervical adenopathy. Neurological: He is alert and oriented to person, place, and time. He has normal reflexes. No cranial nerve deficit. Coordination normal.   Skin: Skin is warm and dry. No rash noted. He is not diaphoretic. No erythema. Psychiatric: Judgment normal.   Appears in pressured and rapid speech but otherwise clear and lucid. Easily gets off track but returns spontaneously. Short term/long memory intact. No psychotic features. Nursing note and vitals reviewed. Diagnostic Study Results     Labs -     Recent Results (from the past 12 hour(s))   CBC WITH AUTOMATED DIFF    Collection Time: 10/27/19  8:45 PM   Result Value Ref Range    WBC 6.4 4.6 - 13.2 K/uL    RBC 5.53 (H) 4.70 - 5.50 M/uL    HGB 16.0 13.0 - 16.0 g/dL    HCT 46.7 36.0 - 48.0 %    MCV 84.4 74.0 - 97.0 FL    MCH 28.9 24.0 - 34.0 PG    MCHC 34.3 31.0 - 37.0 g/dL    RDW 12.0 11.6 - 14.5 %    PLATELET 744 658 - 957 K/uL    MPV 10.1 9.2 - 11.8 FL    NEUTROPHILS 43 40 - 73 %    LYMPHOCYTES 49 21 - 52 %    MONOCYTES 6 3 - 10 %    EOSINOPHILS 2 0 - 5 %    BASOPHILS 0 0 - 2 %    ABS. NEUTROPHILS 2.7 1.8 - 8.0 K/UL    ABS. LYMPHOCYTES 3.1 0.9 - 3.6 K/UL    ABS. MONOCYTES 0.4 0.05 - 1.2 K/UL    ABS. EOSINOPHILS 0.2 0.0 - 0.4 K/UL    ABS.  BASOPHILS 0.0 0.0 - 0.1 K/UL    DF AUTOMATED     METABOLIC PANEL, COMPREHENSIVE    Collection Time: 10/27/19  8:45 PM   Result Value Ref Range    Sodium 140 136 - 145 mmol/L    Potassium 3.5 3.5 - 5.5 mmol/L    Chloride 105 100 - 111 mmol/L    CO2 28 21 - 32 mmol/L    Anion gap 7 3.0 - 18 mmol/L    Glucose 98 74 - 99 mg/dL    BUN 13 7.0 - 18 MG/DL    Creatinine 1.02 0.6 - 1.3 MG/DL    BUN/Creatinine ratio 13 12 - 20      GFR est AA >60 >60 ml/min/1.73m2    GFR est non-AA >60 >60 ml/min/1.73m2    Calcium 9.0 8.5 - 10.1 MG/DL    Bilirubin, total 0.7 0.2 - 1.0 MG/DL    ALT (SGPT) 46 16 - 61 U/L    AST (SGOT) 21 10 - 38 U/L    Alk. phosphatase 81 45 - 117 U/L    Protein, total 7.6 6.4 - 8.2 g/dL    Albumin 4.5 3.4 - 5.0 g/dL    Globulin 3.1 2.0 - 4.0 g/dL    A-G Ratio 1.5 0.8 - 1.7     ETHYL ALCOHOL    Collection Time: 10/27/19  8:45 PM   Result Value Ref Range    ALCOHOL(ETHYL),SERUM <3 0 - 3 MG/DL   DRUG SCREEN, URINE    Collection Time: 10/27/19  8:45 PM   Result Value Ref Range    BENZODIAZEPINES NEGATIVE  NEG      BARBITURATES POSITIVE (A) NEG      THC (TH-CANNABINOL) POSITIVE (A) NEG      OPIATES NEGATIVE  NEG      PCP(PHENCYCLIDINE) NEGATIVE  NEG      COCAINE NEGATIVE  NEG      AMPHETAMINES NEGATIVE  NEG      METHADONE NEGATIVE  NEG      HDSCOM (NOTE)    URINALYSIS W/ RFLX MICROSCOPIC    Collection Time: 10/27/19  8:45 PM   Result Value Ref Range    Color YELLOW      Appearance CLEAR      Specific gravity 1.017 1.005 - 1.030      pH (UA) 7.5 5.0 - 8.0      Protein NEGATIVE  NEG mg/dL    Glucose NEGATIVE  NEG mg/dL    Ketone NEGATIVE  NEG mg/dL    Bilirubin NEGATIVE  NEG      Blood NEGATIVE  NEG      Urobilinogen 1.0 0.2 - 1.0 EU/dL    Nitrites NEGATIVE  NEG      Leukocyte Esterase NEGATIVE  NEG         Radiologic Studies -    No orders to display         Medical Decision Making   I am the first provider for this patient.     I reviewed the vital signs, available nursing notes, past medical history, past surgical history, family history and social history. Vital Signs-Reviewed the patient's vital signs. Pulse Oximetry Analysis - 100% on room air     Records Reviewed: Nursing Notes and Old Medical Records    Provider Notes (Medical Decision Making):   Ddx: See psych exam. He is clearly anxious and has multiple responsibilities. He is not suicidal. I do not feel he is an imminent threat. His exam is trustworthy. Feel he is appropriate for outpatient management. Will work with nursing staff to return his belongings. Procedures:  Procedures    MEDICATIONS GIVEN:  Medications   hydrOXYzine HCl (ATARAX) tablet 25 mg (has no administration in time range)       ED Course:   9:23 PM   Initial assessment performed. The patients presenting problems have been discussed, and they are in agreement with the care plan formulated and outlined with them. I have encouraged them to ask questions as they arise throughout their visit. Diagnosis and Disposition     DISCHARGE NOTE:  9:42 PM  Curtis Mcnally's  results have been reviewed with him. He has been counseled regarding his diagnosis, treatment, and plan. He verbally conveys understanding and agreement of the signs, symptoms, diagnosis, treatment and prognosis and additionally agrees to follow up as discussed. He also agrees with the care-plan and conveys that all of his questions have been answered. I have also provided discharge instructions for him that include: educational information regarding their diagnosis and treatment, and list of reasons why they would want to return to the ED prior to their follow-up appointment, should his condition change. He has been provided with education for proper emergency department utilization. CLINICAL IMPRESSION:    1. Adjustment reaction of adult life    2. Anxiety state        PLAN:  1. D/C Home  2.    Current Discharge Medication List      START taking these medications    Details   hydrOXYzine HCl (ATARAX) 50 mg tablet Take 1 Tab by mouth every six (6) hours as needed for Itching for up to 10 days. Qty: 20 Tab, Refills: 0         CONTINUE these medications which have NOT CHANGED    Details   lisdexamfetamine (VYVANSE) 50 mg cap Take 50 mg by mouth daily. Indications: Attention Deficit Disorder with Hyperactivity           3. Follow-up Information     Follow up With Specialties Details Why Contact Info    [facility]  Schedule an appointment as soon as possible for a visit in 1 day For follow up at [facility] [address] [facility], [facility] [facility] appointment as soon as possible for a visit in 2 days For primary care follow up [address]    THE [facility] EMERGENCY DEPT Emergency Medicine  As needed, If symptoms worsen [address] [address] [phone]          _______________________________    This note was prepared by Ritika Gamble, acting as Scribe for Selena. Ceferino Reaves MD.    Selena. Ceferino Reaves MD:  The scribe's documentation has been prepared under my direction and personally reviewed by me in its entirety. I confirm that the note above accurately reflects all work, treatment, procedures, and medical decision making performed by me.

## 2019-10-28 NOTE — ED TRIAGE NOTES
Pt reports to ED c/o \"being pissed. \" Pt reports, \"my girlfriend cheated on me, and I can't find a job so I'm pissed. Cigarettes aren't calming me down. \" Pt denies any suicidal ideations/ homicidal ideations.

## 2019-10-28 NOTE — DISCHARGE INSTRUCTIONS
Adjustment Disorder: Care Instructions  Your Care Instructions    Adjustment disorder means that you have emotional or behavioral problems because of stress. But your response to the stress is far more severe than a normal response. It is severe enough to affect your work or social life and may cause depression and physical pains and problems. Events that may cause this response can include a divorce, money problems, or starting school or a new job. It might be anything that causes some stress. This disorder is most often a short-term problem. It happens within 3 months of the stressful event or change. If the response lasts longer than 6 months after the event ends, you may have a more serious disorder. Follow-up care is a key part of your treatment and safety. Be sure to make and go to all appointments, and call your doctor if you are having problems. It's also a good idea to know your test results and keep a list of the medicines you take. How can you care for yourself at home? · Go to all counseling sessions. Do not skip any because you are feeling better. · If your doctor prescribed medicines, take them exactly as prescribed. Call your doctor if you think you are having a problem with your medicine. You will get more details on the specific medicines your doctor prescribes. · Discuss the causes of your stress with a good friend or family member. Or you can join a support group for people with similar problems. Talking to others sometimes relieves stress. · Get at least 30 minutes of exercise on most days of the week. Walking is a good choice. You also may want to do other activities, such as running, swimming, cycling, or playing tennis or team sports. Relaxation techniques  Do relaxation exercises 10 to 20 minutes a day. You can play soothing, relaxing music while you do them, if you wish. · Tell others in your house that you are going to do your relaxation exercises.  Ask them not to disturb you.  · Find a comfortable, quiet place. · Lie down on your back, or sit with your back straight. · Focus on your breathing. Make it slow and steady. · Breathe in through your nose. Breathe out through either your nose or mouth. · Breathe deeply, filling up the area between your navel and your rib cage. Breathe so that your belly goes up and down. · Do not hold your breath. · Breathe like this for 5 to 10 minutes. Notice the feeling of calmness throughout your whole body. As you continue to breathe slowly and deeply, relax by doing these next steps for another 5 to 10 minutes:  · Tighten and relax each muscle group in your body. Start at your toes, and work your way up to your head. · Imagine your muscle groups relaxing and getting heavy. · Empty your mind of all thoughts. · Let yourself relax more and more deeply. · Be aware of the state of calmness that surrounds you. · When your relaxation time is over, you can bring yourself back to alertness by moving your fingers and toes. Then move your hands and feet. And then move your entire body. Sometimes people fall asleep during relaxation. But they most often wake up soon. · Always give yourself time to return to full alertness before you drive a car. Wait to do anything that might cause an accident if you are not fully alert. Never play a relaxation tape while you drive a car. When should you call for help? Call 911 anytime you think you may need emergency care. For example, call if:    · You feel you cannot stop from hurting yourself or someone else. Keep the numbers for these national suicide hotlines: 9-914-473-TALK (9-418-022-372.948.1325) and 8-978-QYMFGXL (8-820.279.5398).  If you or someone you know talks about suicide or feeling hopeless, get help right away.    Watch closely for changes in your health, and be sure to contact your doctor if:    · You have new anxiety, or your anxiety gets worse.     · You have been feeling sad, depressed, or hopeless or have lost interest in things that you usually enjoy.     · You do not get better as expected. Where can you learn more? Go to http://edna-ronaldo.info/. Enter [id number] in the search box to learn more about \"Adjustment Disorder: Care Instructions. \"  Current as of: April 7, 2019  Content Version: 12.2  © 4149-0878 [employer], [employer]. Care instructions adapted under license by [employer] (which disclaims liability or warranty for this information). If you have questions about a medical condition or this instruction, always ask your healthcare professional. [place] [address] any warranty or liability for your use of this information.

## 2020-05-26 ENCOUNTER — HOSPITAL ENCOUNTER (EMERGENCY)
Age: 30
Discharge: HOME | End: 2020-05-26
Attending: EMERGENCY MEDICINE
Payer: MEDICAID

## 2020-05-26 VITALS
RESPIRATION RATE: 16 BRPM | HEIGHT: 65 IN | HEART RATE: 94 BPM | BODY MASS INDEX: 21.66 KG/M2 | DIASTOLIC BLOOD PRESSURE: 76 MMHG | SYSTOLIC BLOOD PRESSURE: 124 MMHG | WEIGHT: 130 LBS | OXYGEN SATURATION: 100 % | TEMPERATURE: 97.4 F

## 2020-05-26 DIAGNOSIS — M26.622 ARTHRALGIA OF LEFT TEMPOROMANDIBULAR JOINT: Primary | ICD-10-CM

## 2020-05-26 PROCEDURE — 99282 EMERGENCY DEPT VISIT SF MDM: CPT

## 2020-05-26 RX ORDER — METHYLPREDNISOLONE 4 MG/1
TABLET ORAL
Qty: 1 DOSE PACK | Refills: 0 | Status: SHIPPED | OUTPATIENT
Start: 2020-05-26

## 2020-05-26 NOTE — ED PROVIDER NOTES
EMERGENCY DEPARTMENT HISTORY AND PHYSICAL EXAM    Date: 5/26/2020  Patient Name: Rochel Eisenmenger    History of Presenting Illness     Chief Complaint   Patient presents with    Jaw Pain     pt reports 3-4d of left-sided jaw pain that worsened with opening, pt reports he does grind teeth, denies CP/SOB, fever/chills, N/V/D. History Provided By: Patient    Chief Complaint: jaw pain    HPI(Context):   4:36 PM  Rochel Eisenmenger is a 34 y.o. male with PMHX of ADHD who presents to the emergency department C/O left sided jaw pain. Pain x one week. Reports pain worse with opening and closing jaw and especially moving jaw side to side. No trauma. No swelling. Pt is on Vyvanse for ADHD. Pt unsure if he grinds his teeth. No relief with tylenol. Pt denies fever, swelling, dental pain, chest pain, and any other sxs or complaints. Pt is active smoker. PCP: Other, MD Mookie    Current Outpatient Medications   Medication Sig Dispense Refill    methylPREDNISolone (Medrol, Fortunato,) 4 mg tablet Take with food. 1 Dose Pack 0    lisdexamfetamine (VYVANSE) 50 mg cap Take 50 mg by mouth daily. Indications: Attention Deficit Disorder with Hyperactivity         Past History     Past Medical History:  Past Medical History:   Diagnosis Date    Psychiatric disorder     ADHD       Past Surgical History:  History reviewed. No pertinent surgical history. Family History:  History reviewed. No pertinent family history. Social History:  Social History     Tobacco Use    Smoking status: Current Some Day Smoker     Packs/day: 0.25    Smokeless tobacco: Never Used   Substance Use Topics    Alcohol use: Yes     Comment: celebrations    Drug use: No       Allergies: Allergies   Allergen Reactions    Aleve [Naproxen Sodium] Swelling    Ibuprofen Other (comments)     Sweats          Review of Systems   Review of Systems   Constitutional: Negative for fever. HENT: Positive for ear pain. Negative for dental problem and facial swelling. Left sided jaw pain   Cardiovascular: Negative for chest pain. Musculoskeletal: Negative for neck pain. Neurological: Negative for headaches. All other systems reviewed and are negative. Physical Exam     Vitals:    05/26/20 1632   BP: 124/76   Pulse: 94   Resp: 16   Temp: 97.4 °F (36.3 °C)   SpO2: 100%   Weight: 59 kg (130 lb)   Height: 5' 5\" (1.651 m)     Physical Exam  Vitals signs and nursing note reviewed. Constitutional:       General: He is not in acute distress. Appearance: He is well-developed. He is not diaphoretic. Comments: Male in NAD. Alert. Appears comfortable. HENT:      Head: Normocephalic and atraumatic. No right periorbital erythema or left periorbital erythema. Jaw: There is normal jaw occlusion. Tenderness (left TMJ) and pain on movement present. No trismus, swelling or malocclusion. Right Ear: External ear normal. No drainage or swelling. Tympanic membrane is not perforated, erythematous or bulging. Left Ear: External ear normal. No drainage or swelling. Tympanic membrane is not perforated, erythematous or bulging. Nose: Nose normal. No mucosal edema or rhinorrhea. Right Sinus: No maxillary sinus tenderness or frontal sinus tenderness. Left Sinus: No maxillary sinus tenderness or frontal sinus tenderness. Mouth/Throat:      Mouth: No oral lesions. Dentition: No dental abscesses. Pharynx: Uvula midline. No oropharyngeal exudate, posterior oropharyngeal erythema or uvula swelling. Tonsils: No tonsillar abscesses. Eyes:      Conjunctiva/sclera: Conjunctivae normal.   Neck:      Musculoskeletal: Normal range of motion. Cardiovascular:      Rate and Rhythm: Normal rate and regular rhythm. Heart sounds: Normal heart sounds. No murmur. No friction rub. No gallop. Pulmonary:      Effort: Pulmonary effort is normal. No tachypnea or accessory muscle usage. Breath sounds: Normal breath sounds.  No decreased breath sounds. Abdominal:      Palpations: Abdomen is soft. Tenderness: There is no abdominal tenderness. Musculoskeletal: Normal range of motion. Lymphadenopathy:      Cervical: No cervical adenopathy. Skin:     General: Skin is warm and dry. Neurological:      Mental Status: He is alert and oriented to person, place, and time. Psychiatric:         Judgment: Judgment normal.             Diagnostic Study Results     Labs -   No results found for this or any previous visit (from the past 12 hour(s)). No orders to display     CT Results  (Last 48 hours)    None        CXR Results  (Last 48 hours)    None          Medications given in the ED-  Medications - No data to display      Medical Decision Making   I am the first provider for this patient. I reviewed the vital signs, available nursing notes, past medical history, past surgical history, family history and social history. Vital Signs-Reviewed the patient's vital signs. Pulse Oximetry Analysis - 100% on RA. Normal     Records Reviewed: Nursing Notes    Provider Notes (Medical Decision Making): TMJ, OM, dental, adenopathy    Procedures:  Procedures    ED Course:   4:36 PM Initial assessment performed. The patients presenting problems have been discussed, and they are in agreement with the care plan formulated and outlined with them. I have encouraged them to ask questions as they arise throughout their visit. Diagnosis and Disposition       Will tx for TMJ. Invest in mouth guard as signs of bruxism on dental examination. No evidence of abscess or infectious process. ENT FU. Reasons to RTED discussed with pt. All questions answered. Pt feels comfortable going home at this time. Pt expressed understanding and he agrees with plan. 1. Arthralgia of left temporomandibular joint        PLAN:  1. D/C Home  2.    Current Discharge Medication List      START taking these medications    Details   methylPREDNISolone (MedrolFortunato,) 4 mg tablet Take with food. Qty: 1 Dose Pack, Refills: 0           3. Follow-up Information     Follow up With Specialties Details Why Contact Info    Lee Servin MD Otolaryngology, [address]      [facility] EMERGENCY DEPT Emergency Medicine  As needed, If symptoms worsen [address] [place] [id number]  [phone]        _______________________________    Attestations: This note is prepared by Esther Riddle PA-C.  _______________________________          Please note that this dictation was completed with [employer], the computer voice recognition software. Quite often unanticipated grammatical, syntax, homophones, and other interpretive errors are inadvertently transcribed by the computer software. Please disregard these errors. Please excuse any errors that have escaped final proofreading.

## 2020-05-26 NOTE — ED TRIAGE NOTES
Pt reports 3-4d of left-sided jaw pain that worsened with opening, pt reports he does grind teeth, denies CP/SOB, fever/chills, N/V/D.

## 2020-09-15 ENCOUNTER — APPOINTMENT (OUTPATIENT)
Dept: GENERAL RADIOLOGY | Age: 30
End: 2020-09-15
Attending: EMERGENCY MEDICINE
Payer: MEDICAID

## 2020-09-15 ENCOUNTER — HOSPITAL ENCOUNTER (EMERGENCY)
Age: 30
Discharge: HOME | End: 2020-09-15
Attending: EMERGENCY MEDICINE
Payer: MEDICAID

## 2020-09-15 VITALS
BODY MASS INDEX: 21.66 KG/M2 | HEIGHT: 65 IN | WEIGHT: 130 LBS | RESPIRATION RATE: 16 BRPM | DIASTOLIC BLOOD PRESSURE: 83 MMHG | SYSTOLIC BLOOD PRESSURE: 129 MMHG | HEART RATE: 94 BPM | TEMPERATURE: 98.4 F | OXYGEN SATURATION: 100 %

## 2020-09-15 DIAGNOSIS — S62.015A CLOSED NONDISPLACED FRACTURE OF DISTAL POLE OF SCAPHOID BONE OF LEFT WRIST, INITIAL ENCOUNTER: Primary | ICD-10-CM

## 2020-09-15 PROCEDURE — 99283 EMERGENCY DEPT VISIT LOW MDM: CPT

## 2020-09-15 PROCEDURE — 73110 X-RAY EXAM OF WRIST: CPT

## 2020-09-15 PROCEDURE — 74011250637 HC RX REV CODE- 250/637: Performed by: EMERGENCY MEDICINE

## 2020-09-15 RX ORDER — HYDROCODONE BITARTRATE AND ACETAMINOPHEN 5; 325 MG/1; MG/1
1 TABLET ORAL
Status: COMPLETED | OUTPATIENT
Start: 2020-09-15 | End: 2020-09-15

## 2020-09-15 RX ORDER — HYDROCODONE BITARTRATE AND ACETAMINOPHEN 5; 325 MG/1; MG/1
1 TABLET ORAL
Qty: 10 TAB | Refills: 0 | Status: SHIPPED | OUTPATIENT
Start: 2020-09-15 | End: 2020-09-18

## 2020-09-15 RX ADMIN — HYDROCODONE BITARTRATE AND ACETAMINOPHEN 1 TABLET: 5; 325 TABLET ORAL at 23:10

## 2020-09-15 NOTE — LETTER
[facility] [place] 
[facility] 
[address] [address] [phone] Work/School Note Date: 9/15/2020 To Whom It May concern: 
 
Amarjit Sanchez was seen and treated today in the emergency room by the following provider(s): 
Attending Provider: Gloria Escudero DO. Amarjit Sanchez may return to work on 9/17/2020. Sincerely, Alon Barry DO

## 2020-09-16 NOTE — DISCHARGE INSTRUCTIONS
You were seen and evaluated in the Emergency Department. Please understand that your work up is not all encompassing and you should follow up with your primary care physician for further management and continuity of care. Please make sure that you call to schedule follow-up appointment with a bone/orthopedic doctor. Please return to Emergency Department or seek medical attention immediately if you have acute worsening in your symptoms or develop chest pain, shortness of breath, repeated vomiting, fever, altered level of consciousness, coughing up blood, or start sweating and feel clammy. If you were prescribed any medicine for home, please take as prescribed by your health-care provider. If you were given any follow-up appointments or numbers to call, please do so as instructed. Avoid any tobacco products or excessive alcohol. Patient Education        Navicular (Scaphoid) Fracture of the Wrist: Care Instructions  Your Care Instructions  A navicular fracture (also called a scaphoid fracture) is a break in a small bone on the thumb side of your wrist. It can cause pain and swelling in the wrist and make it hard to move your wrist.  You may have broken this bone by putting your hand out to break a fall. Treatment for this type of break includes wearing an arm cast or splint and sometimes having surgery. The type of treatment depends on how bad the break is. Even if the first X-rays don't show a break, there may be one. If your doctor thinks a break is possible, he or she will treat it. It is better to do this than risk not treating a fracture and possibly delay healing. If the doctor treats the break, he or she may ask you to come back in 1 to 2 weeks for another X-ray. It is important to follow the doctor's instructions because parts of the navicular bone do not have a good blood supply. This can make healing slow and difficult. You heal best when you take good care of yourself.  Eat a variety of healthy foods, and don't smoke. The doctor has checked you carefully, but problems can develop later. If you notice any problems or new symptoms, get medical treatment right away. Follow-up care is a key part of your treatment and safety. Be sure to make and go to all appointments, and call your doctor if you are having problems. It's also a good idea to know your test results and keep a list of the medicines you take. How can you care for yourself at home? · Be safe with medicines. Read and follow all instructions on the label. ? If the doctor gave you a prescription medicine for pain, take it as prescribed. ? If you are not taking a prescription pain medicine, ask your doctor if you can take an over-the-counter medicine. · Prop up your wrist on pillows when you sit or lie down in the first few days after the injury. Keep your wrist higher than the level of your heart. This will help reduce swelling. · Put ice or a cold pack on your wrist for 10 to 20 minutes at a time. Try to do this every 1 to 2 hours for the next 3 days (when you are awake) or until the swelling goes down. Put a thin cloth between the ice and your skin. · Follow your doctor's directions for wearing a splint or cast.  When should you call for help? Call your doctor now or seek immediate medical care if:    · You have problems with your cast or splint. For example:  ? The skin under the cast or splint is burning or stinging. ? The cast or splint feels too tight. ? There is a lot of swelling near the cast or splint. (Some swelling is normal.)  ? You have a new fever. ? There is drainage or a bad smell coming from the cast or splint.     · You have severe or increasing pain.     · Your fingers turn cold or change color.     · You have tingling, weakness, or numbness in your hand and fingers. Watch closely for changes in your health, and be sure to contact your doctor if:    · You do not get better as expected.    Where can you learn more?  Go to http://edna-ronaldo.info/  Enter X286 in the search box to learn more about \"Navicular (Scaphoid) Fracture of the Wrist: Care Instructions. \"  Current as of: March 2, 2020               Content Version: 12.6  © 7335-5567 [employer]. Care instructions adapted under license by [employer] (which disclaims liability or warranty for this information). If you have questions about a medical condition or this instruction, always ask your healthcare professional. [address] any warranty or liability for your use of this information.

## 2020-09-16 NOTE — ED TRIAGE NOTES
Pt arrives c/o left wrist pain. Pt was helping someone carry an object and he slipped and fell on wrist. Pt wrapped his wrist pta.

## 2020-09-16 NOTE — ED PROVIDER NOTES
EMERGENCY DEPARTMENT HISTORY AND PHYSICAL EXAM    Date: 9/15/2020  Patient Name: Jovana Diallo    History of Presenting Illness     Chief Complaint   Patient presents with    Wrist Pain         History Provided By: Patient    Additional History (Context):   Jovana Diallo is a 34 y.o. male with PMHX nicotine dependence presents to the emergency department C/O left wrist pain following a fall and hitting his wrist directly. [place] hitting his head. Denies neck pain or back pain. Pt denies numbness or weakness, and any other sxs or complaints. Dates that he has not taken anything for pain. PCP: Mookie Lloyd MD    Current Outpatient Medications   Medication Sig Dispense Refill    HYDROcodone-acetaminophen (Norco) 5-325 mg per tablet Take 1 Tab by mouth every six (6) hours as needed for Pain for up to 3 days. Max Daily Amount: 4 Tabs. 10 Tab 0    methylPREDNISolone (Medrol, Fortunato,) 4 mg tablet Take with food. 1 Dose Pack 0    lisdexamfetamine (VYVANSE) 50 mg cap Take 50 mg by mouth daily. Indications: Attention Deficit Disorder with Hyperactivity         Past History     Past Medical History:  Past Medical History:   Diagnosis Date    Psychiatric disorder     ADHD       Past Surgical History:  History reviewed. No pertinent surgical history. Family History:  History reviewed. No pertinent family history. Social History:  Social History     Tobacco Use    Smoking status: Current Some Day Smoker     Packs/day: 0.25    Smokeless tobacco: Never Used   Substance Use Topics    Alcohol use: Yes     Comment: celebrations    Drug use: No       Allergies: Allergies   Allergen Reactions    Aleve [Naproxen Sodium] Swelling    Ibuprofen Other (comments)     Sweats          Review of Systems   Review of Systems   Constitutional: Negative for chills and fever. HENT: Negative for congestion, ear pain, sinus pain and sore throat. Eyes: Negative for pain and visual disturbance.    Respiratory: Negative for cough and shortness of breath. Cardiovascular: Negative for chest pain and leg swelling. Gastrointestinal: Negative for abdominal pain, constipation, diarrhea, nausea and vomiting. Genitourinary: Negative for dysuria and hematuria. Musculoskeletal: Positive for arthralgias. Negative for back pain and neck pain. Skin: Negative for pallor and rash. Neurological: Negative for dizziness, tremors, weakness, light-headedness and headaches. All other systems reviewed and are negative. Physical Exam     Vitals:    09/15/20 2132   BP: 129/83   Pulse: 94   Resp: 16   Temp: 98.4 °F (36.9 °C)   SpO2: 100%   Weight: 59 kg (130 lb)   Height: 5' 5\" (1.651 m)     Physical Exam    Nursing note and vitals reviewed    Constitutional: Caty Potter male, no acute distress  Head: Normocephalic, Atraumatic  Eyes: Pupils are equal, round, and reactive to light, EOMI  Neck: Supple, non-tender  Chest: Normal work of breathing and chest excursion bilaterally  Back: No evidence of trauma or deformity  Extremities: Tenderness along the radial portion of his left distal wrist with overlying erythema. There is also mild ecchymoses. There is mild swelling with no obvious crepitus. +2 radial pulse. Forearm is soft and supple. No streaking erythema, vesicular lesions, ulcerations or bulla  Skin: Warm and dry, normal cap refill  Neuro: Alert and appropriate, CN intact, normal speech, moving all 4 extremities freely and symmetrically  Psychiatric: Normal mood and affect       Diagnostic Study Results     Labs -   No results found for this or any previous visit (from the past 12 hour(s)). Radiologic Studies -   XR WRIST LT AP/LAT/OBL MIN 3V   Final Result   IMPRESSION: Comminuted fracture midportion of the scaphoid. This would be prone   to developing avascular necrosis the proximal pole. Orthopedic consultation   advised.          CT Results  (Last 48 hours)    None        CXR Results  (Last 48 hours)    None            Medical Decision Making   I am the first provider for this patient. I reviewed the vital signs, available nursing notes, past medical history, past surgical history, family history and social history. Vital Signs-Reviewed the patient's vital signs. Pulse Oximetry Analysis -100 % on room air    Cardiac Monitor:  Rate: 94 bpm  Rhythm: Regular    Records Reviewed: Nursing Notes and Old Medical Records    Provider Notes:   34 y.o. male presenting with left wrist pain after fall. On exam patient has tenderness over his left radial portion of the distal wrist.  He has mild overlying erythema, ecchymoses and swelling. No obvious crepitus. Compartments are soft and supple, neurovascular intact. Obtain x-rays to evaluate. Procedures:  Procedures    ED Course:   9:37 PM   Initial assessment performed. The patients presenting problems have been discussed, and they are in agreement with the care plan formulated and outlined with them. I have encouraged them to ask questions as they arise throughout their visit. SMOKING CESSATION:  The patient was counseled on the dangers of tobacco use, and was advised to quit. Reviewed strategies to maximize success, including removing cigarettes and smoking materials from environment. Discussion took 3-5 minutes, and pt expressed understanding. 10:39 PM  X-ray showing comminuted fracture of the scaphoid. There is no indication for emergent orthopedic consultation at this time however discussed with the patient that he will require close orthopedic follow-up as there are risk for complications in the future. Patient placed in a volar splint. Definitive fracture care provided. I personally authorized and inspected volarsplint         Diagnosis and Disposition       DISCHARGE NOTE:  10:39 PM    Curtis Mcnally's  results have been reviewed with him. He has been counseled regarding his diagnosis, treatment, and plan.   He verbally conveys understanding and agreement of the signs, symptoms, diagnosis, treatment and prognosis and additionally agrees to follow up as discussed. He also agrees with the care-plan and conveys that all of his questions have been answered. I have also provided discharge instructions for him that include: educational information regarding their diagnosis and treatment, and list of reasons why they would want to return to the ED prior to their follow-up appointment, should his condition change. He has been provided with education for proper emergency department utilization. CLINICAL IMPRESSION:    1. Closed nondisplaced fracture of distal pole of scaphoid bone of left wrist, initial encounter        PLAN:  1. D/C Home  2. Current Discharge Medication List      START taking these medications    Details   HYDROcodone-acetaminophen (Norco) 5-325 mg per tablet Take 1 Tab by mouth every six (6) hours as needed for Pain for up to 3 days. Max Daily Amount: 4 Tabs. Qty: 10 Tab, Refills: 0    Associated Diagnoses: Closed nondisplaced fracture of distal pole of scaphoid bone of left wrist, initial encounter           3. Follow-up Information     Follow up With Specialties Details Why [address], DO Orthopedic Surgery Call in 1 day  [address] [facility] [address]      THE [facility] EMERGENCY DEPT Emergency Medicine  As needed if symptoms worsen [address] [place] [address]  [phone]        ____________________________________     Please note that this dictation was completed with [employer], the computer voice recognition software. Quite often unanticipated grammatical, syntax, homophones, and other interpretive errors are inadvertently transcribed by the computer software. Please disregard these errors. Please excuse any errors that have escaped final proofreading.

## 2022-12-27 ENCOUNTER — HOSPITAL ENCOUNTER (EMERGENCY)
Age: 32
Discharge: HOME | End: 2022-12-27
Attending: EMERGENCY MEDICINE
Payer: MEDICAID

## 2022-12-27 VITALS
OXYGEN SATURATION: 100 % | TEMPERATURE: 98 F | WEIGHT: 130 LBS | HEIGHT: 65 IN | DIASTOLIC BLOOD PRESSURE: 78 MMHG | BODY MASS INDEX: 21.66 KG/M2 | HEART RATE: 72 BPM | RESPIRATION RATE: 18 BRPM | SYSTOLIC BLOOD PRESSURE: 122 MMHG

## 2022-12-27 DIAGNOSIS — S02.5XXB OPEN FRACTURE OF TOOTH, INITIAL ENCOUNTER: Primary | ICD-10-CM

## 2022-12-27 PROCEDURE — 99283 EMERGENCY DEPT VISIT LOW MDM: CPT

## 2022-12-27 PROCEDURE — 75810000283 HC INJECTION NERVE BLOCK

## 2022-12-27 RX ORDER — ACETAMINOPHEN AND CODEINE PHOSPHATE 300; 30 MG/1; MG/1
1 TABLET ORAL
Qty: 19 TABLET | Refills: 0 | Status: SHIPPED | OUTPATIENT
Start: 2022-12-27 | End: 2023-01-01

## 2022-12-27 RX ORDER — PENICILLIN V POTASSIUM 500 MG/1
500 TABLET, FILM COATED ORAL 4 TIMES DAILY
Qty: 28 TABLET | Refills: 0 | Status: SHIPPED | OUTPATIENT
Start: 2022-12-27 | End: 2023-01-03

## 2022-12-27 NOTE — ED PROVIDER NOTES
EMERGENCY DEPARTMENT HISTORY AND PHYSICAL EXAM    Date: 12/27/2022  Patient Name: Uriel Multani    History of Presenting Illness     Chief Complaint   Patient presents with    Dental Pain         History Provided By: Patient      Uriel Multani is a 28 y.o. male with PMHX of poor dentition who presents to the emergency department C/O fractured tooth. 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 He states pain is 8 out of a 10. Says he called his dentist but unable to get in for 2 weeks. PCP: Alejandro Almanzar, DO    Current Outpatient Medications   Medication Sig Dispense Refill    methylPREDNISolone (Medrol, Fortunato,) 4 mg tablet Take with food. 1 Dose Pack 0    lisdexamfetamine (VYVANSE) 50 mg cap Take 50 mg by mouth daily. Indications: Attention Deficit Disorder with Hyperactivity         Past History         Past Medical History:  Past Medical History:   Diagnosis Date    Psychiatric disorder     ADHD       Past Surgical History:  No past surgical history on file. Family History:  No family history on file. Social History:  Social History     Tobacco Use    Smoking status: Some Days     Packs/day: 0.25     Types: Cigarettes    Smokeless tobacco: Never   Substance Use Topics    Alcohol use: Yes     Comment: celebrations    Drug use: No       Allergies: Allergies   Allergen Reactions    Aleve [Naproxen Sodium] Swelling    Ibuprofen Other (comments)     Sweats          Review of Systems   Review of Systems   HENT:  Positive for dental problem. All other systems reviewed and are negative.         Physical Exam     Vitals:    12/27/22 1703 12/27/22 1704 12/27/22 1705   BP:   122/78   Pulse:  72    Resp: 18     Temp: 98 °F (36.7 °C)     SpO2: 100%     Weight: 59 kg (130 lb)     Height: 5' 5\" (1.651 m)       Physical Exam    Nursing notes and vital signs reviewed     Constitutional: Non toxic appearing, moderate distress  Head: Normocephalic, Atraumatic  Eyes: EOMI  Neck: Supple  Dental: There is a fracture with exposed pulp of #4. Cardiovascular: Regular rate and rhythm, no murmurs, rubs, or gallops  Chest: Normal work of breathing and chest excursion bilaterally  Lungs: Clear to ausculation bilaterally  Abdomen: Soft, non tender, non distended, normoactive bowel sounds  Back: No evidence of trauma or deformity  Extremities: No evidence of trauma or deformity, no LE edema  Skin: Warm and dry, normal cap refill  Psychiatric: Normal mood and affect      Diagnostic Study Results     Labs -   No results found for this or any previous visit (from the past 12 hour(s)). Radiologic Studies -none indicated  No orders to display     CT Results  (Last 48 hours)      None          CXR Results  (Last 48 hours)      None            Medications given in the ED-  Medications - No data to display      Medical Decision Making   I am the first provider for this patient. I reviewed the vital signs, available nursing notes, past medical history, past surgical history, family history and social history. Vital Signs-Reviewed the patient's vital signs. Pulse Oximetry Analysis - 100% on room air, not hypoxic         Records Reviewed: Nursing Notes    Provider Notes (Medical Decision Making): Dayanara Pineda is a 28 y.o. male with fractured tooth. Will prescribe antibiotics to prevent pulpitis. Dental block for pain relief. Procedures:  Nerve Block    Date/Time: 12/27/2022 6:30 PM  Performed by: Jaymie Turner MD  Authorized by: Jaymie Turner MD     Consent:     Consent obtained:  Verbal    Consent given by:  Patient    Risks, benefits, and alternatives were discussed: yes      Risks discussed:   Allergic reaction, infection, nerve damage, pain, swelling, unsuccessful block, bleeding and intravenous injection    Alternatives discussed:  No treatment, delayed treatment and alternative treatment  Universal protocol:     Procedure explained and questions answered to patient or proxy's satisfaction: yes      Immediately prior to procedure, a time out was called: yes      Patient identity confirmed:  Verbally with patient and arm band  Indications:     Indications:  Pain relief  Location:     Body area:  Head    Head nerve blocked: Local dental of #4. Laterality:  Right  Procedure details:     Block needle gauge:  22 G    Anesthetic injected:  Bupivacaine 0.5% w/o epi    Steroid injected:  None    Paresthesia:  None  Post-procedure details:     Dressing:  None    Outcome:  Pain relieved    Procedure completion:  Tolerated well, no immediate complications  Comments:      Local nerve block. Dental #4 good pain relief achieved patient tolerated well. ED Course:   Dental block successfully achieved patient provided with penicillin VK prescription and prescription for Tylenol 3. Follow-up with dentist in 2 weeks as planned. Diagnosis and Disposition     Critical Care:     DISCHARGE NOTE:    Coni Santo  results have been reviewed with him. He has been counseled regarding his diagnosis, treatment, and plan. He verbally conveys understanding and agreement of the signs, symptoms, diagnosis, treatment and prognosis and additionally agrees to follow up as discussed. He also agrees with the care-plan and conveys that all of his questions have been answered. I have also provided discharge instructions for him that include: educational information regarding their diagnosis and treatment, and list of reasons why they would want to return to the ED prior to their follow-up appointment, should his condition change. He has been provided with education for proper emergency department utilization. CLINICAL IMPRESSION:    No diagnosis found. PLAN:  1. D/C Home  2. Current Discharge Medication List        3.    Follow-up Information    None       _______________________________      Please note that this dictation was completed with Dragon, the computer voice recognition software. Quite often unanticipated grammatical, syntax, homophones, and other interpretive errors are inadvertently transcribed by the computer software. Please disregard these errors. Please excuse any errors that have escaped final proofreading.

## 2023-01-02 VITALS
OXYGEN SATURATION: 98 % | HEIGHT: 65 IN | DIASTOLIC BLOOD PRESSURE: 78 MMHG | WEIGHT: 130 LBS | BODY MASS INDEX: 21.66 KG/M2 | SYSTOLIC BLOOD PRESSURE: 120 MMHG | TEMPERATURE: 97.8 F | HEART RATE: 73 BPM | RESPIRATION RATE: 17 BRPM

## 2023-01-02 PROCEDURE — 99283 EMERGENCY DEPT VISIT LOW MDM: CPT

## 2023-01-03 ENCOUNTER — HOSPITAL ENCOUNTER (EMERGENCY)
Age: 33
Discharge: HOME | End: 2023-01-03
Attending: EMERGENCY MEDICINE
Payer: MEDICAID

## 2023-01-03 DIAGNOSIS — R51.9 FACIAL PAIN: Primary | ICD-10-CM

## 2023-01-03 DIAGNOSIS — S02.5XXB OPEN FRACTURE OF TOOTH, INITIAL ENCOUNTER: ICD-10-CM

## 2023-01-03 DIAGNOSIS — K08.89 DENTALGIA: ICD-10-CM

## 2023-01-03 RX ORDER — HYDROCODONE BITARTRATE AND ACETAMINOPHEN 5; 325 MG/1; MG/1
1 TABLET ORAL
Qty: 5 TABLET | Refills: 0 | OUTPATIENT
Start: 2023-01-03 | End: 2023-01-06

## 2023-01-03 NOTE — ED TRIAGE NOTES
Pt presents to ED with cc of right upper dental pain. Reports his follow up with his dentist isn't until 1/19.

## 2023-01-03 NOTE — Clinical Note
[facility] [place]  [facility]  [address]  [facility] [address] [phone]    Work/School Note    Date: 1/2/2023    To Whom It May concern:    Zhen Velez was seen and treated today in the emergency room by the following provider(s):  Physician Assistant: Lopez Fraire. Zhen Velez is excused from work/school on 01/03/23 and 01/04/23. He is medically clear to return to work/school on 1/5/2023.        Sincerely,          PETEY Bal

## 2023-01-03 NOTE — Clinical Note
[facility] [place]  [facility]  [address]  [facility] [address] [phone]    Work/School Note    Date: 1/2/2023    To Whom It May concern:    Yamil Luna was seen and treated today in the emergency room by the following provider(s):  Physician Assistant: Quincy Jasmine, [address]. Yamil Luna is excused from work/school on 01/03/23 and 01/04/23. He is medically clear to return to work/school on 1/5/2023.        Sincerely,          PETEY Brooke

## 2023-01-03 NOTE — DISCHARGE INSTRUCTIONS
Dr. Neno Ayala, [facility] [address] [place], [facility] [address]  [address]:  [address] [facility], [address] [facility]  [phone]  Elex Mitten, and Extractions    [facility]  [address] [facility], [address] [place]  [phone]  Elex Mitten, and Extractions    [facility]  [address] [facility] 159  [phone]  Fillings, Cleaning, and [address] [place]  [address] [place], [address] [facility]  [phone]    [facility] Department  [address], [address]  [phone]  Ages 3-18, if attending [facility]  [address] [facility], [address] [facility] [address]  [phone]  Extractions, Kwasi Kemp, [facility]    [facility]  [address], [address] [facility] [address]  [phone]  Oral Surgery - $70 required  [place], Extractions - $100 Required    [address]   [address], [address]  [phone]  [facility] Only    [address], [address] [facility]  Dental Clinic Open September to June

## 2023-01-03 NOTE — ED PROVIDER NOTES
EMERGENCY DEPARTMENT HISTORY AND PHYSICAL EXAM    Date: 1/3/2023  Patient Name: Edson Erazo    History of Presenting Illness     Chief Complaint   Patient presents with    Dental Pain         History Provided By: Patient    Chief Complaint: dental pain       Additional History (Context):   2:18 AM  Edson Erazo is a 28 y.o. male  presents to the emergency department C/O right upper dental pain that started 12/27 after he broke it while eating. Patient was seen and started on penicillin and given Tylenol 3. Made a dental appointment but is not till the middle of the month. No swelling of the face. No fevers. No difficulty swallowing. Denies any other medical problems. PCP: Jessi Robb, DO    Current Outpatient Medications   Medication Sig Dispense Refill    HYDROcodone-acetaminophen (Norco) 5-325 mg per tablet Take 1 Tablet by mouth every six (6) hours as needed for Pain for up to 5 days. Max Daily Amount: 4 Tablets. 5 Tablet 0    penicillin v potassium (VEETID) 500 mg tablet Take 1 Tablet by mouth four (4) times daily for 7 days. Indications: inflammation of the gums and mouth 28 Tablet 0    methylPREDNISolone (Medrol, Fortunato,) 4 mg tablet Take with food. 1 Dose Pack 0    lisdexamfetamine (VYVANSE) 50 mg cap Take 50 mg by mouth daily. Indications: Attention Deficit Disorder with Hyperactivity         Past History     Past Medical History:  Past Medical History:   Diagnosis Date    Psychiatric disorder     ADHD       Past Surgical History:  No past surgical history on file. Family History:  No family history on file. Social History:  Social History     Tobacco Use    Smoking status: Some Days     Packs/day: 0.25     Types: Cigarettes    Smokeless tobacco: Never   Substance Use Topics    Alcohol use: Yes     Comment: celebrations    Drug use: No       Allergies:   Allergies   Allergen Reactions    Aleve [Naproxen Sodium] Swelling    Ibuprofen Other (comments)     Sweats        Review of Systems Review of Systems   Constitutional:  Negative for chills and fever. HENT:  Positive for dental problem and facial swelling. Negative for drooling, ear discharge, ear pain, rhinorrhea, sinus pressure, sinus pain, sneezing, sore throat, tinnitus, trouble swallowing and voice change. Respiratory:  Negative for cough and shortness of breath. Cardiovascular:  Negative for chest pain. Gastrointestinal:  Negative for abdominal pain, diarrhea, nausea and vomiting. Musculoskeletal:  Negative for back pain and neck pain. Neurological:  Negative for dizziness, weakness, light-headedness, numbness and headaches. All other systems reviewed and are negative. Physical Exam     Vitals:    01/02/23 2210 01/02/23 2211   BP:  120/78   Pulse:  73   Resp:  17   Temp:  97.8 °F (36.6 °C)   SpO2:  98%   Weight: 59 kg (130 lb)    Height: 5' 5\" (1.651 m)      Physical Exam  Vitals and nursing note reviewed. Constitutional:       General: He is not in acute distress. Appearance: He is well-developed. He is not diaphoretic. Comments: Alert, non toxic, appears slightly uncomfortable   HENT:      Head: Normocephalic and atraumatic. Right Ear: Tympanic membrane, ear canal and external ear normal.      Left Ear: Tympanic membrane, ear canal and external ear normal.      Nose: Nose normal.      Mouth/Throat:      Mouth: Mucous membranes are not dry. Dentition: Abnormal dentition. Dental tenderness and dental caries present. No gingival swelling or dental abscesses. Pharynx: Uvula midline. No oropharyngeal exudate or posterior oropharyngeal erythema. Tonsils: No tonsillar abscesses. Cardiovascular:      Rate and Rhythm: Normal rate and regular rhythm. Heart sounds: Normal heart sounds. Pulmonary:      Effort: Pulmonary effort is normal. No respiratory distress. Breath sounds: Normal breath sounds. No stridor. No wheezing or rales.    Musculoskeletal:      Cervical back: Normal range of motion and neck supple. Lymphadenopathy:      Cervical: No cervical adenopathy. Skin:     General: Skin is warm and dry. Neurological:      Mental Status: He is alert and oriented to person, place, and time. Psychiatric:         Judgment: Judgment normal.       Diagnostic Study Results     Labs:   No results found for this or any previous visit (from the past 12 hour(s)). Radiologic Studies:   No orders to display     CT Results  (Last 48 hours)      None          CXR Results  (Last 48 hours)      None            Medical Decision Making   I am the first provider for this patient. I reviewed the vital signs, available nursing notes, past medical history, past surgical history, family history and social history. Vital Signs: Reviewed the patient's vital signs. Pulse Oximetry Analysis: 98% on RA       Records Reviewed: Nursing Notes and Old Medical Records    Procedures:  Procedures    ED Course:   2:18 AM Initial assessment performed. The patients presenting problems have been discussed, and they are in agreement with the care plan formulated and outlined with them. I have encouraged them to ask questions as they arise throughout their visit. Discussion:  Pt presents with dental pain for the past week or so after he fractured it seen initially started on penicillin when she has been taking tried taking Tylenol with codeine. States his pain has persisted. No evidence of deep tissue infection or abscess. We will have patient finish up the penicillin will give few [place] and have patient keep appointment with dentist. Strict return precautions given, pt offering no questions or complaints. Diagnosis and Disposition     DISCHARGE NOTE:  Betty Lee  results have been reviewed with him. He has been counseled regarding his diagnosis, treatment, and plan.   He verbally conveys understanding and agreement of the signs, symptoms, diagnosis, treatment and prognosis and additionally agrees to follow up as discussed. He also agrees with the care-plan and conveys that all of his questions have been answered. I have also provided discharge instructions for him that include: educational information regarding their diagnosis and treatment, and list of reasons why they would want to return to the ED prior to their follow-up appointment, should his condition change. He has been provided with education for proper emergency department utilization. CLINICAL IMPRESSION:    1. Facial pain    2. Dentalgia    3. Open fracture of tooth, initial encounter        PLAN:  1. D/C Home  2. Current Discharge Medication List        START taking these medications    Details   HYDROcodone-acetaminophen (Norco) 5-325 mg per tablet Take 1 Tablet by mouth every six (6) hours as needed for Pain for up to 5 days. Max Daily Amount: 4 Tablets. Qty: 5 Tablet, Refills: 0  Start date: 1/3/2023, End date: 1/8/2023    Comments: Attending physician Lisa Dobbins  Associated Diagnoses: Morenathea Vickie           3. Follow-up Information       Follow up With Specialties Details Why [facility]    dental clinic        [facility] EMERGENCY DEPT Emergency Medicine   [address] [place] [address]  [phone]                   Please note that this dictation was completed with [employer], the computer voice recognition software. Quite often unanticipated grammatical, syntax, homophones, and other interpretive errors are inadvertently transcribed by the computer software. Please disregard these errors. Please excuse any errors that have escaped final proofreading.

## 2023-01-06 ENCOUNTER — HOSPITAL ENCOUNTER (EMERGENCY)
Age: 33
Discharge: HOME | End: 2023-01-06
Attending: EMERGENCY MEDICINE
Payer: MEDICAID

## 2023-01-06 VITALS
BODY MASS INDEX: 21.66 KG/M2 | TEMPERATURE: 98.3 F | HEIGHT: 65 IN | DIASTOLIC BLOOD PRESSURE: 83 MMHG | RESPIRATION RATE: 18 BRPM | OXYGEN SATURATION: 100 % | WEIGHT: 130 LBS | HEART RATE: 121 BPM | SYSTOLIC BLOOD PRESSURE: 136 MMHG

## 2023-01-06 DIAGNOSIS — K02.9 DENTAL DECAY: ICD-10-CM

## 2023-01-06 DIAGNOSIS — K08.89 PAIN, DENTAL: Primary | ICD-10-CM

## 2023-01-06 PROCEDURE — 74011000250 HC RX REV CODE- 250: Performed by: PHYSICIAN ASSISTANT

## 2023-01-06 PROCEDURE — 99283 EMERGENCY DEPT VISIT LOW MDM: CPT

## 2023-01-06 PROCEDURE — 74011250637 HC RX REV CODE- 250/637: Performed by: PHYSICIAN ASSISTANT

## 2023-01-06 RX ORDER — DIPHENHYDRAMINE HCL 12.5MG/5ML
25 ELIXIR ORAL
Status: COMPLETED | OUTPATIENT
Start: 2023-01-06 | End: 2023-01-06

## 2023-01-06 RX ORDER — HYDROCODONE BITARTRATE AND ACETAMINOPHEN 5; 325 MG/1; MG/1
1 TABLET ORAL
Qty: 10 TABLET | Refills: 0 | Status: SHIPPED | OUTPATIENT
Start: 2023-01-06 | End: 2023-01-11

## 2023-01-06 RX ADMIN — BENZOCAINE, BUTAMBEN, AND TETRACAINE HYDROCHLORIDE 1 SPRAY: .028; .004; .004 AEROSOL, SPRAY TOPICAL at 11:41

## 2023-01-06 RX ADMIN — DIPHENHYDRAMINE HYDROCHLORIDE 25 MG: 25 SOLUTION ORAL at 11:41

## 2023-01-06 NOTE — DISCHARGE INSTRUCTIONS
It appears that your dental pain is directly related to extensive tooth decay, and you really need to see the dentist for tooth extraction versus root canal.  We have provided you with Norco pain medication for when the pain is severe but you should try to take Tylenol with the pain is more mild as well as use the medicated-soaked gauze that we are giving you. You can keep the gauze right next to the painful tooth for several hours and keep the rest of the gauze in the refrigerator until you are ready to use more.

## 2023-01-06 NOTE — ED PROVIDER NOTES
EMERGENCY DEPARTMENT HISTORY AND PHYSICAL EXAM      Date: 1/6/2023  Patient Name: Misael James    History of Presenting Illness     Chief Complaint   Patient presents with    Dental Pain       History Provided By: Patient    HPI: Misael James, 28 y.o. male with past medical history to include ADHD is presenting with right upper dental pain, chronic, extensive dental decay. He states that he has an appointment with the dentist in about 2 weeks but the pain became unbearable today. He is allergic to ibuprofen and Tylenol is not helping. He has been taking pain medication that he has been prescribed from the emergency department but he is run out again. He is not currently on antibiotics. He knows that he needs to see the dentist but is hoping to get something to help with his pain until he can follow-up. He specifically denies fever, chills, body aches, drainage, facial swelling. There are no other complaints, changes, or physical findings at this time. Past History     Past Medical History:  Past Medical History:   Diagnosis Date    Psychiatric disorder     ADHD       Past Surgical History:  No past surgical history on file. Family History:  History reviewed. No pertinent family history. Social History:  Social History     Tobacco Use    Smoking status: Some Days     Packs/day: 0.25     Types: Cigarettes    Smokeless tobacco: Never   Substance Use Topics    Alcohol use: Yes     Comment: celebrations    Drug use: No       Allergies: Allergies   Allergen Reactions    Aleve [Naproxen Sodium] Swelling    Ibuprofen Other (comments)     Sweats        PCP: Ruby Barkley, DO    No current facility-administered medications on file prior to encounter. Current Outpatient Medications on File Prior to Encounter   Medication Sig Dispense Refill    [DISCONTINUED] HYDROcodone-acetaminophen (Norco) 5-325 mg per tablet Take 1 Tablet by mouth every six (6) hours as needed for Pain for up to 5 days.  Max Daily Amount: 4 Tablets. 5 Tablet 0    methylPREDNISolone (Medrol, Fortunato,) 4 mg tablet Take with food. 1 Dose Pack 0    lisdexamfetamine (VYVANSE) 50 mg cap Take 50 mg by mouth daily. Indications: Attention Deficit Disorder with Hyperactivity         Review of Systems   Review of Systems   Constitutional:  Negative for activity change, chills and fever. HENT:  Positive for dental problem. Negative for congestion, ear pain, rhinorrhea and trouble swallowing. Eyes:  Negative for pain and visual disturbance. Respiratory:  Negative for cough and shortness of breath. Cardiovascular:  Negative for chest pain. Gastrointestinal:  Negative for abdominal pain, diarrhea, nausea and vomiting. Genitourinary:  Negative for decreased urine volume, difficulty urinating, dysuria and hematuria. Musculoskeletal:  Negative for arthralgias and myalgias. Skin:  Negative for rash. Neurological:  Negative for weakness and headaches. Hematological:  Negative for adenopathy. Psychiatric/Behavioral:  The patient is not nervous/anxious. All other systems reviewed and are negative. Physical Exam   Physical Exam  Vitals and nursing note reviewed. Constitutional:       General: He is not in acute distress. Appearance: He is well-developed. He is not ill-appearing. HENT:      Head: Normocephalic and atraumatic. Mouth/Throat:      Mouth: Mucous membranes are moist.      Dentition: Abnormal dentition. Dental caries present. No dental abscesses. Pharynx: Oropharynx is clear. Comments: +extensive dental decay especially right upper with right upper molar with fracture and periodontal disease, no evidence of abscess  Eyes:      Extraocular Movements: Extraocular movements intact. Pupils: Pupils are equal, round, and reactive to light. Cardiovascular:      Rate and Rhythm: Normal rate. Pulses: Normal pulses. Pulmonary:      Effort: Pulmonary effort is normal. No respiratory distress.    Skin: General: Skin is warm and dry. Capillary Refill: Capillary refill takes less than 2 seconds. Findings: No rash. Neurological:      General: No focal deficit present. Mental Status: He is alert. Cranial Nerves: No cranial nerve deficit. Psychiatric:         Mood and Affect: Mood normal.         Behavior: Behavior normal.       Lab and Diagnostic Study Results   Labs -   No results found for this or any previous visit (from the past 12 hour(s)). Radiologic Studies -   @lastxrresult@  CT Results  (Last 48 hours)      None          CXR Results  (Last 48 hours)      None            Medical Decision Making and ED Course   Differential Diagnosis & Medical Decision Making Provider Note:   26-year-old male is presenting with right upper dental pain. He has extensive periodontal disease on exam.  There is no drainable abscess, no facial swelling. He is tachycardic which I suspect is secondary to his pain. In the emergency department, he was provided with 2 x 2 gauze pads soaked in Cetacaine spray and Benadryl liquid that he can place directly next to the tooth for pain relief. He can continue to use these at home. He is allergic to ibuprofen and naproxen. We will also send in a short term prescription for hydrocodone-acetaminophen. He has been advised to use this for severe pain and to try to take only Tylenol and use the \"dental balls\" for mild pain with the plan to see the dentist.    - I am the first provider for this patient. I reviewed the vital signs, available nursing notes, past medical history, past surgical history, family history and social history. The patients presenting problems have been discussed, and they are in agreement with the care plan formulated and outlined with them. I have encouraged them to ask questions as they arise throughout their visit. Vital Signs-Reviewed the patient's vital signs.   Patient Vitals for the past 12 hrs:   Temp Pulse Resp BP SpO2   01/06/23 0948 98.3 °F (36.8 °C) (!) 121 18 136/83 100 %       ED Course:        Procedures   Performed by: Mahesh Lugo PA-C  Procedures      Disposition   Disposition: DC- Adult Discharges: All of the diagnostic tests were reviewed and questions answered. Diagnosis, care plan and treatment options were discussed. The patient understands the instructions and will follow up as directed. The patients results have been reviewed with them. They have been counseled regarding their diagnosis. The patient verbally convey understanding and agreement of the signs, symptoms, diagnosis, treatment and prognosis and additionally agrees to follow up as recommended with their PCP in 24 - 48 hours. They also agree with the care-plan and convey that all of their questions have been answered. I have also put together some discharge instructions for them that include: 1) educational information regarding their diagnosis, 2) how to care for their diagnosis at home, as well a 3) list of reasons why they would want to return to the ED prior to their follow-up appointment, should their condition change. Discharged     DISCHARGE PLAN:  1. Current Discharge Medication List        CONTINUE these medications which have NOT CHANGED    Details   HYDROcodone-acetaminophen (Norco) 5-325 mg per tablet Take 1 Tablet by mouth every six (6) hours as needed for Pain for up to 5 days. Max Daily Amount: 4 Tablets. Qty: 5 Tablet, Refills: 0    Comments: Attending physician Prateek Broussard  Associated Diagnoses: Dentalgia      methylPREDNISolone (Medrol, Fortunato,) 4 mg tablet Take with food. Qty: 1 Dose Pack, Refills: 0      lisdexamfetamine (VYVANSE) 50 mg cap Take 50 mg by mouth daily. Indications: Attention Deficit Disorder with Hyperactivity           2.    Follow-up Information       Follow up With Specialties Details Why Contact Info    [address]  Go to  for follow up from ER visit [address] [facility] [address] [facility]    [facility] Emergency Medicine  As needed, If symptoms worsen [address] [facility] [address] [phone]          3. Return to ED if worse   4. Discharge Medication List as of 1/6/2023 12:21 PM        CONTINUE these medications which have CHANGED    Details   HYDROcodone-acetaminophen (Norco) 5-325 mg per tablet Take 1 Tablet by mouth every eight (8) hours as needed for Pain for up to 5 days. Max Daily Amount: 3 Tablets., Normal, Disp-10 Tablet, R-0Dr. Shane Villegas, ED Attending           CONTINUE these medications which have NOT CHANGED    Details   methylPREDNISolone (Medrol, Fortunato,) 4 mg tablet Take with food. , Print, Disp-1 Dose Pack, R-0      lisdexamfetamine (VYVANSE) 50 mg cap Take 50 mg by mouth daily. Indications: Attention Deficit Disorder with Hyperactivity, Historical Med             Diagnosis/Clinical Impression     Clinical Impression:   1. Pain, dental    2. Dental decay        Attestations: Sonya ROSAS PA-C, am the primary clinician of record. Please note that this dictation was completed with [employer], the [employer] voice recognition software. Quite often unanticipated grammatical, syntax, homophones, and other interpretive errors are inadvertently transcribed by the computer software. Please disregard these errors. Please excuse any errors that have escaped final proofreading. Thank you.

## 2023-01-08 ENCOUNTER — HOSPITAL ENCOUNTER (EMERGENCY)
Age: 33
Discharge: HOME | End: 2023-01-09
Attending: EMERGENCY MEDICINE
Payer: MEDICAID

## 2023-01-08 VITALS
RESPIRATION RATE: 16 BRPM | DIASTOLIC BLOOD PRESSURE: 78 MMHG | SYSTOLIC BLOOD PRESSURE: 130 MMHG | BODY MASS INDEX: 21.63 KG/M2 | HEART RATE: 111 BPM | TEMPERATURE: 98 F | OXYGEN SATURATION: 100 % | WEIGHT: 130 LBS

## 2023-01-08 DIAGNOSIS — K08.89 PAIN, DENTAL: Primary | ICD-10-CM

## 2023-01-08 PROCEDURE — 75810000283 HC INJECTION NERVE BLOCK

## 2023-01-08 PROCEDURE — 99283 EMERGENCY DEPT VISIT LOW MDM: CPT

## 2023-01-09 VITALS
DIASTOLIC BLOOD PRESSURE: 71 MMHG | RESPIRATION RATE: 18 BRPM | HEART RATE: 112 BPM | OXYGEN SATURATION: 100 % | SYSTOLIC BLOOD PRESSURE: 120 MMHG | TEMPERATURE: 98.1 F

## 2023-01-09 DIAGNOSIS — K02.9 DENTAL CARIES: ICD-10-CM

## 2023-01-09 DIAGNOSIS — K08.89 DENTALGIA: ICD-10-CM

## 2023-01-09 DIAGNOSIS — R51.9 FACIAL PAIN: Primary | ICD-10-CM

## 2023-01-09 PROCEDURE — 75810000283 HC INJECTION NERVE BLOCK

## 2023-01-09 PROCEDURE — 99283 EMERGENCY DEPT VISIT LOW MDM: CPT

## 2023-01-09 PROCEDURE — 74011250637 HC RX REV CODE- 250/637: Performed by: PHYSICIAN ASSISTANT

## 2023-01-09 RX ORDER — LIDOCAINE HYDROCHLORIDE 20 MG/ML
15 SOLUTION OROPHARYNGEAL AS NEEDED
Qty: 100 ML | Refills: 0 | Status: SHIPPED | OUTPATIENT
Start: 2023-01-09

## 2023-01-09 RX ORDER — BUPIVACAINE HYDROCHLORIDE 2.5 MG/ML
5 INJECTION, SOLUTION EPIDURAL; INFILTRATION; INTRACAUDAL
Status: DISCONTINUED | OUTPATIENT
Start: 2023-01-09 | End: 2023-01-09 | Stop reason: HOSPADM

## 2023-01-09 RX ORDER — LIDOCAINE HYDROCHLORIDE 10 MG/ML
5 INJECTION, SOLUTION EPIDURAL; INFILTRATION; INTRACAUDAL; PERINEURAL ONCE
Status: DISCONTINUED | OUTPATIENT
Start: 2023-01-09 | End: 2023-01-09 | Stop reason: HOSPADM

## 2023-01-09 RX ORDER — HYDROCODONE BITARTRATE AND ACETAMINOPHEN 5; 325 MG/1; MG/1
1 TABLET ORAL
Status: COMPLETED | OUTPATIENT
Start: 2023-01-09 | End: 2023-01-09

## 2023-01-09 RX ADMIN — HYDROCODONE BITARTRATE AND ACETAMINOPHEN 1 TABLET: 5; 325 TABLET ORAL at 19:36

## 2023-01-09 NOTE — ED PROVIDER NOTES
EMERGENCY DEPARTMENT HISTORY AND PHYSICAL EXAM      Date: 1/8/2023  Patient Name: Lokesh Cruz    History of Presenting Illness     Chief Complaint   Patient presents with    Dental Pain       History Provided By: Patient    HPI: Lokesh Cruz, 28 y.o. male with no significant past medical history is presenting to the emergency department with right upper dental pain. This has been a recurrent/chronic problem for him. He has an appointment with the dentist in 10 days. He has been seen in the emergency department multiple times for this complaint. Today, he is asking for a dental block. He denies any new symptoms, facial swelling, fever, drainage. There are no other complaints, changes, or physical findings at this time. Past History     Past Medical History:  Past Medical History:   Diagnosis Date    Psychiatric disorder     ADHD       Past Surgical History:  No past surgical history on file. Family History:  No family history on file. Social History:  Social History     Tobacco Use    Smoking status: Some Days     Packs/day: 0.25     Types: Cigarettes    Smokeless tobacco: Never   Substance Use Topics    Alcohol use: Yes     Comment: celebrations    Drug use: No       Allergies: Allergies   Allergen Reactions    Aleve [Naproxen Sodium] Swelling    Ibuprofen Other (comments)     Sweats        PCP: Per Villagomez, DO    No current facility-administered medications on file prior to encounter. Current Outpatient Medications on File Prior to Encounter   Medication Sig Dispense Refill    HYDROcodone-acetaminophen (Norco) 5-325 mg per tablet Take 1 Tablet by mouth every eight (8) hours as needed for Pain for up to 5 days. Max Daily Amount: 3 Tablets. 10 Tablet 0    methylPREDNISolone (Medrol, Fortunato,) 4 mg tablet Take with food. 1 Dose Pack 0    lisdexamfetamine (VYVANSE) 50 mg cap Take 50 mg by mouth daily.  Indications: Attention Deficit Disorder with Hyperactivity         Review of Systems   Review of Systems   Constitutional:  Negative for activity change, chills and fever. HENT:  Positive for dental problem. Negative for congestion, ear pain, rhinorrhea and trouble swallowing. Eyes:  Negative for pain and visual disturbance. Respiratory:  Negative for cough and shortness of breath. Cardiovascular:  Negative for chest pain. Gastrointestinal:  Negative for abdominal pain, diarrhea, nausea and vomiting. Genitourinary:  Negative for decreased urine volume, difficulty urinating, dysuria and hematuria. Musculoskeletal:  Negative for arthralgias and myalgias. Skin:  Negative for rash. Neurological:  Negative for weakness and headaches. Hematological:  Negative for adenopathy. Psychiatric/Behavioral:  The patient is not nervous/anxious. All other systems reviewed and are negative. Physical Exam   Physical Exam  Vitals and nursing note reviewed. Constitutional:       General: He is not in acute distress. Appearance: He is well-developed. He is not ill-appearing. HENT:      Head: Normocephalic and atraumatic. Mouth/Throat:      Mouth: Mucous membranes are moist.      Dentition: Abnormal dentition. Dental caries present. No gingival swelling or dental abscesses. Pharynx: Oropharynx is clear. Comments: +fracture/periodontal disease noted to the right upper molar  Eyes:      Extraocular Movements: Extraocular movements intact. Pupils: Pupils are equal, round, and reactive to light. Cardiovascular:      Rate and Rhythm: Normal rate. Pulmonary:      Effort: Pulmonary effort is normal. No respiratory distress. Skin:     General: Skin is warm and dry. Capillary Refill: Capillary refill takes less than 2 seconds. Findings: No rash. Neurological:      General: No focal deficit present. Mental Status: He is alert. Cranial Nerves: No cranial nerve deficit.    Psychiatric:         Mood and Affect: Mood normal.         Behavior: Behavior normal. Lab and Diagnostic Study Results   Labs -   No results found for this or any previous visit (from the past 12 hour(s)). Radiologic Studies -   @lastxrresult@  CT Results  (Last 48 hours)      None          CXR Results  (Last 48 hours)      None            Medical Decision Making and ED Course   Differential Diagnosis & Medical Decision Making Provider Note:   42-year-old male presenting to the emergency department for right upper dental pain. This is the patient's fourth visit to the emergency department in the last 4 weeks. He is allergic to NSAID medication. He states Tylenol is not helping. He is requesting dental block at this time as he did receive hydrocodone at his last visit 2 days ago. He also received dental balls which is concept of a mixture of Cetacaine spray and liquid Benadryl. He advises that he has an appointment with the dentist this month. - I am the first provider for this patient. I reviewed the vital signs, available nursing notes, past medical history, past surgical history, family history and social history. The patients presenting problems have been discussed, and they are in agreement with the care plan formulated and outlined with them. I have encouraged them to ask questions as they arise throughout their visit. Vital Signs-Reviewed the patient's vital signs. Wt Readings from Last 3 Encounters:   01/08/23 59 kg (130 lb)   01/06/23 59 kg (130 lb)   01/02/23 59 kg (130 lb)     Temp Readings from Last 3 Encounters:   01/09/23 98.1 °F (36.7 °C)   01/08/23 98 °F (36.7 °C)   01/06/23 98.3 °F (36.8 °C)     BP Readings from Last 3 Encounters:   01/09/23 120/71   01/08/23 130/78   01/06/23 136/83     Pulse Readings from Last 3 Encounters:   01/09/23 (!) 112   01/08/23 (!) 111   01/06/23 (!) 121       No data found.       ED Course:      Procedures   Performed by: Hay Shields PA-C  Nerve Block    Date/Time: 1/9/2023 12:43 AM  Performed by: Cyn Inman DANTE  Authorized by: Sandeep James PA-C     Consent:     Consent obtained:  Verbal    Consent given by:  Patient    Risks, benefits, and alternatives were discussed: yes      Risks discussed: Allergic reaction, infection, bleeding, intravenous injection, nerve damage, pain, swelling and unsuccessful block    Alternatives discussed:  Alternative treatment and referral  Universal protocol:     Patient identity confirmed:  Verbally with patient  Indications:     Indications:  Pain relief  Location:     Body area:  Head    Head nerve blocked: inferior alveolar nerve. Skin anesthesia:     Skin anesthesia method:  None  Procedure details:     Block needle gauge:  22 G    Anesthetic injected:  Bupivacaine 0.25% w/o epi and lidocaine 1% w/o epi    Steroid injected:  None    Additive injected:  None    Injection procedure:  Anatomic landmarks identified, incremental injection, negative aspiration for blood, anatomic landmarks palpated and introduced needle    Paresthesia:  Prolonged  Post-procedure details:     Dressing:  None    Outcome:  Pain relieved    Procedure completion:  Tolerated      Disposition   Disposition: DC- Adult Discharges: All of the diagnostic tests were reviewed and questions answered. Diagnosis, care plan and treatment options were discussed. The patient understands the instructions and will follow up as directed. The patients results have been reviewed with them. They have been counseled regarding their diagnosis. The patient verbally convey understanding and agreement of the signs, symptoms, diagnosis, treatment and prognosis and additionally agrees to follow up as recommended with their PCP in 24 - 48 hours. They also agree with the care-plan and convey that all of their questions have been answered.   I have also put together some discharge instructions for them that include: 1) educational information regarding their diagnosis, 2) how to care for their diagnosis at home, as well a 3) list of reasons why they would want to return to the ED prior to their follow-up appointment, should their condition change. Discharged     DISCHARGE PLAN:  1. Current Discharge Medication List        CONTINUE these medications which have NOT CHANGED    Details   HYDROcodone-acetaminophen (Norco) 5-325 mg per tablet Take 1 Tablet by mouth every eight (8) hours as needed for Pain for up to 5 days. Max Daily Amount: 3 Tablets. Qty: 10 Tablet, Refills: 0    Comments: Dr. Hernandez Sullivan, ED Attending  Associated Diagnoses: Pain, dental      methylPREDNISolone (Medrol, Fortunato,) 4 mg tablet Take with food. Qty: 1 Dose Pack, Refills: 0      lisdexamfetamine (VYVANSE) 50 mg cap Take 50 mg by mouth daily. Indications: Attention Deficit Disorder with Hyperactivity           2. Follow-up Information       Follow up With Specialties Details Why [facility] [facility]   for follow up from ER visit [address]. [address] [facility] [address] [phone]    [facility] EMERGENCY DEPT Emergency Medicine  As needed, If symptoms worsen 2 Yasmin Molina  [address] [address] [phone]          3. Return to ED if worse   4. Discharge Medication List as of 1/9/2023 12:51 AM        START taking these medications    Details   lidocaine (Lidocaine Viscous) 2 % solution Take 15 mL by mouth as needed for Pain., Normal, Disp-100 mL, R-0           CONTINUE these medications which have NOT CHANGED    Details   HYDROcodone-acetaminophen (Norco) 5-325 mg per tablet Take 1 Tablet by mouth every eight (8) hours as needed for Pain for up to 5 days. Max Daily Amount: 3 Tablets., Normal, Disp-10 Tablet, R-0Dr. Hernandez Sullivan, ED Attending      methylPREDNISolone (Medrol, Fortunato,) 4 mg tablet Take with food. , Print, Disp-1 Dose Pack, R-0      lisdexamfetamine (VYVANSE) 50 mg cap Take 50 mg by mouth daily.  Indications: Attention Deficit Disorder with Hyperactivity, Historical Med Diagnosis/Clinical Impression     Clinical Impression:   1. Pain, dental        Attestations: I, Romana Scarlet, PA-C, am the primary clinician of record. Please note that this dictation was completed with [employer], the computer voice recognition software. Quite often unanticipated grammatical, syntax, homophones, and other interpretive errors are inadvertently transcribed by the computer software. Please disregard these errors. Please excuse any errors that have escaped final proofreading. Thank you.

## 2023-01-09 NOTE — ED TRIAGE NOTES
Patient ambulatory to ED c/o upper right molar pain.      Patient was recently seen in ED, referred to dental but unable to have appointment until 19th     Patient able to speak in full sentences

## 2023-01-09 NOTE — ED TRIAGE NOTES
Pt arrives to ed reporting right upper dental pain that began yesterday. Pt has an appt at his dentist on the 19th of this month.

## 2023-01-10 NOTE — DISCHARGE INSTRUCTIONS
Dr. Laine Green, [facility] [address], [facility] 159  [address]:  330 [facility], 101 [facility]  [phone]  Olam Barge, and Extractions    [facility]  [address] [facility], [address]  [phone]  Olam Barge, and Extractions    [facility]  53[address] [facility] 159  [phone]  Fillings, Cleaning, and [address]  [address] [place], 39[address] [facility]  [phone]    [facility] Department  [address], [facility] [address]  [phone]  Ages 3-18, if attending [facility]  [address] [facility], [address] [facility] [address]  [phone]  Extractions, Kwasi Kemp, [facility] Clinical Center    [facility]  [address], [address] [facility] [address]  [phone]  Oral Surgery - $70 required  Angelica Balint, Extractions - $100 Required    [address]   [address], [address]  [phone]  [facility] Only    [address], 1519 [facility]  Dental Clinic Open September to June

## 2023-01-11 ENCOUNTER — HOSPITAL ENCOUNTER (EMERGENCY)
Age: 33
Discharge: HOME | End: 2023-01-11
Attending: EMERGENCY MEDICINE
Payer: MEDICAID

## 2023-01-11 VITALS
DIASTOLIC BLOOD PRESSURE: 85 MMHG | SYSTOLIC BLOOD PRESSURE: 165 MMHG | RESPIRATION RATE: 18 BRPM | TEMPERATURE: 98.1 F | HEART RATE: 113 BPM | OXYGEN SATURATION: 100 %

## 2023-01-11 DIAGNOSIS — K02.9 DENTAL CARIES: Primary | ICD-10-CM

## 2023-01-11 DIAGNOSIS — K08.89 TOOTHACHE: ICD-10-CM

## 2023-01-11 PROCEDURE — 99283 EMERGENCY DEPT VISIT LOW MDM: CPT

## 2023-01-11 RX ORDER — OXYCODONE AND ACETAMINOPHEN 5; 325 MG/1; MG/1
1 TABLET ORAL
Qty: 9 TABLET | Refills: 0 | Status: SHIPPED | OUTPATIENT
Start: 2023-01-11 | End: 2023-01-14

## 2023-01-11 RX ORDER — LIDOCAINE HYDROCHLORIDE 20 MG/ML
10 SOLUTION ORAL; TOPICAL
Qty: 200 ML | Refills: 0 | Status: SHIPPED | OUTPATIENT
Start: 2023-01-11

## 2023-01-11 RX ORDER — PENICILLIN V POTASSIUM 500 MG/1
500 TABLET, FILM COATED ORAL 4 TIMES DAILY
Qty: 40 TABLET | Refills: 0 | Status: SHIPPED | OUTPATIENT
Start: 2023-01-11 | End: 2023-01-21

## 2023-01-11 NOTE — Clinical Note
[facility] [place]  [facility] EMERGENCY DEPT  [address] [address] [phone]    Work/School Note    Date: 1/11/2023    To Whom It May concern:      Yissel Yoder was seen and treated today in the emergency room by the following provider(s):  Attending Provider: Allyson Matos MD  Physician Assistant: Stephenie Paget, PA. Yissel Yoder is excused from work/school on 01/11/23. He is clear to return to work/school on 01/12/23.         Sincerely,          PETEY Cosby

## 2023-01-11 NOTE — Clinical Note
[facility] [place]  [facility]  [address]  [facility] [address] [phone]    Work/School Note    Date: 1/11/2023    To Whom It May concern:    Marlene Garland was seen and treated today in the emergency room by the following provider(s):  Attending Provider: Dontrell Cote MD  Physician Assistant: PETEY Poole. Marlene Garland is excused from work/school on 01/11/23 and 01/12/23. He is medically clear to return to work/school on 1/13/2023.        Sincerely,          PETEY Caballero

## 2023-01-11 NOTE — Clinical Note
[facility] [place]  [facility]  [address] [facility] [address] [phone]    Work/School Note    Date: 1/11/2023    To Whom It May concern:    Bernadette Ricketts was seen and treated today in the emergency room by the following provider(s):  Attending Provider: Lia Neri MD  Physician Assistant: PETEY Mendoza. Bernadette Ricketts is excused from work/school on 01/11/23 and 01/12/23. He is medically clear to return to work/school on 1/13/2023.        Sincerely,          Chava Forman LPN

## 2023-01-12 NOTE — ED PROVIDER NOTES
EMERGENCY DEPARTMENT HISTORY AND PHYSICAL EXAM    Date: 1/11/2023  Patient Name: Cas Maldonado    History of Presenting Illness     Chief Complaint   Patient presents with    Dental Pain         History Provided By: Patient        Additional History (Context): Cas Maldonado is a 28 y.o. male with No significant past medical history who presents with persistent right upper dental pain. Patient has pain for more than a week. Seen last in our ED recently for same. Just cannot afford to get to do a dentist.  Needs a work note for tonight because he called out because of the pain. PCP: Jolie Gone, DO    Current Outpatient Medications   Medication Sig Dispense Refill    penicillin v potassium (VEETID) 500 mg tablet Take 1 Tablet by mouth four (4) times daily for 10 days. 40 Tablet 0    Lidocaine Viscous 2 % solution Take 10 mL by mouth two (2) times daily as needed for Pain. Put 10ml onto guaze and put in mouth; hold for 30min. Can repeat up to twice daily. Indications: administration of local anesthetic drug 200 mL 0    oxyCODONE-acetaminophen (Percocet) 5-325 mg per tablet Take 1 Tablet by mouth every eight (8) hours as needed for Pain for up to 3 days. Max Daily Amount: 3 Tablets. 9 Tablet 0    lidocaine (Lidocaine Viscous) 2 % solution Take 15 mL by mouth as needed for Pain. 100 mL 0    HYDROcodone-acetaminophen (Norco) 5-325 mg per tablet Take 1 Tablet by mouth every eight (8) hours as needed for Pain for up to 5 days. Max Daily Amount: 3 Tablets. 10 Tablet 0    methylPREDNISolone (Medrol, Fortunato,) 4 mg tablet Take with food. 1 Dose Pack 0    lisdexamfetamine (VYVANSE) 50 mg cap Take 50 mg by mouth daily. Indications: Attention Deficit Disorder with Hyperactivity         Past History     Past Medical History:  Past Medical History:   Diagnosis Date    Psychiatric disorder     ADHD       Past Surgical History:  No past surgical history on file. Family History:  No family history on file.     Social History:  Social History     Tobacco Use    Smoking status: Some Days     Packs/day: 0.25     Types: Cigarettes    Smokeless tobacco: Never   Substance Use Topics    Alcohol use: Yes     Comment: celebrations    Drug use: No       Allergies: Allergies   Allergen Reactions    Aleve [Naproxen Sodium] Swelling    Ibuprofen Other (comments)     Sweats          Review of Systems   Review of Systems   Constitutional:  Negative for fever. HENT:  Positive for dental problem. Negative for facial swelling. All Other Systems Negative  Physical Exam     Vitals:    01/11/23 2117   BP: (!) 165/85   Pulse: (!) 113   Resp: 18   Temp: 98.1 °F (36.7 °C)   SpO2: 100%     Physical Exam  Vitals and nursing note reviewed. Constitutional:       General: He is not in acute distress. Appearance: He is well-developed. He is not ill-appearing, toxic-appearing or diaphoretic. HENT:      Head: Normocephalic and atraumatic. Mouth/Throat:      Comments: Numerous examples of dental caries. Right upper second premolar is eroded and tender as well as is his canine. No adjacent gingival fluctuance seen. No drooling or trismus. Neck:      Thyroid: No thyromegaly. Vascular: No carotid bruit. Trachea: No tracheal deviation. Cardiovascular:      Rate and Rhythm: Normal rate and regular rhythm. Heart sounds: Normal heart sounds. No murmur heard. No friction rub. No gallop. Pulmonary:      Effort: Pulmonary effort is normal. No respiratory distress. Breath sounds: Normal breath sounds. No stridor. No wheezing or rales. Chest:      Chest wall: No tenderness. Abdominal:      General: There is no distension. Palpations: Abdomen is soft. There is no mass. Tenderness: There is no abdominal tenderness. There is no guarding or rebound. Musculoskeletal:         General: Normal range of motion. Cervical back: Normal range of motion and neck supple. Skin:     General: Skin is warm and dry. Coloration: Skin is not pale. Neurological:      Mental Status: He is alert. Psychiatric:         Speech: Speech normal.         Behavior: Behavior normal.         Thought Content: Thought content normal.         Judgment: Judgment normal.          Diagnostic Study Results     Labs -   No results found for this or any previous visit (from the past 12 hour(s)). Radiologic Studies -   No orders to display     CT Results  (Last 48 hours)      None          CXR Results  (Last 48 hours)      None              Medical Decision Making   I am the first provider for this patient. I reviewed the vital signs, available nursing notes, past medical history, past surgical history, family history and social history. Vital Signs-Reviewed the patient's vital signs. Records Reviewed: Nursing Notes    Procedures:  Procedures    Provider Notes (Medical Decision Making): Concern with multiple visits that patient has gotten and not getting appropriate follow-up recommending affordable dentures for replacement of his teeth which are numerous and examples of dental caries. Treat with antibiotics pain medications topically as well as orally. MED RECONCILIATION:  No current facility-administered medications for this encounter. Current Outpatient Medications   Medication Sig    penicillin v potassium (VEETID) 500 mg tablet Take 1 Tablet by mouth four (4) times daily for 10 days. Lidocaine Viscous 2 % solution Take 10 mL by mouth two (2) times daily as needed for Pain. Put 10ml onto guaze and put in mouth; hold for 30min. Can repeat up to twice daily. Indications: administration of local anesthetic drug    oxyCODONE-acetaminophen (Percocet) 5-325 mg per tablet Take 1 Tablet by mouth every eight (8) hours as needed for Pain for up to 3 days. Max Daily Amount: 3 Tablets. lidocaine (Lidocaine Viscous) 2 % solution Take 15 mL by mouth as needed for Pain.     HYDROcodone-acetaminophen (Norco) 5-325 mg per tablet Take 1 Tablet by mouth every eight (8) hours as needed for Pain for up to 5 days. Max Daily Amount: 3 Tablets. methylPREDNISolone (Medrol, Fortunato,) 4 mg tablet Take with food. lisdexamfetamine (VYVANSE) 50 mg cap Take 50 mg by mouth daily. Indications: Attention Deficit Disorder with Hyperactivity       Disposition:  home    DISCHARGE NOTE:   9:52 PM    Pt has been reexamined. Patient has no new complaints, changes, or physical findings. Care plan outlined and precautions discussed. Results of exam were reviewed with the patient. All medications were reviewed with the patient; will d/c home with percocet, lidocaine, pen vk. All of pt's questions and concerns were addressed. Patient was instructed and agrees to follow up with dental, as well as to return to the ED upon further deterioration. Patient is ready to go home. Follow-up Information       Follow up With Specialties Details Why Contact Info    Affordable Dentures  Schedule an appointment as soon as possible for a visit in 1 day  [address] [address]    THE [facility] EMERGENCY DEPT Emergency Medicine  If symptoms worsen return immediately [address] [facility] [phone]    [address] [place]  Schedule an appointment as soon as possible for a visit in 2 days  [address] [facility], [address] [address]  [phone]            Current Discharge Medication List        START taking these medications    Details   penicillin v potassium (VEETID) 500 mg tablet Take 1 Tablet by mouth four (4) times daily for 10 days. Qty: 40 Tablet, Refills: 0  Start date: 1/11/2023, End date: 1/21/2023      ! ! Lidocaine Viscous 2 % solution Take 10 mL by mouth two (2) times daily as needed for Pain. Put 10ml onto guaze and put in mouth; hold for 30min. Can repeat up to twice daily.   Indications: administration of local anesthetic drug  Qty: 200 mL, Refills: 0  Start date: 1/11/2023 oxyCODONE-acetaminophen (Percocet) 5-325 mg per tablet Take 1 Tablet by mouth every eight (8) hours as needed for Pain for up to 3 days. Max Daily Amount: 3 Tablets. Qty: 9 Tablet, Refills: 0  Start date: 1/11/2023, End date: 1/14/2023    Comments: ED Attending: Orlando Stout DO  GUNNER: [id number]  Associated Diagnoses: Toothache       !! - Potential duplicate medications found. Please discuss with provider. CONTINUE these medications which have NOT CHANGED    Details   !! lidocaine (Lidocaine Viscous) 2 % solution Take 15 mL by mouth as needed for Pain. Qty: 100 mL, Refills: 0       !! - Potential duplicate medications found. Please discuss with provider. Diagnosis     Clinical Impression:   1. Dental caries    2.  Toothache

## 2023-01-12 NOTE — DISCHARGE INSTRUCTIONS
Dr. Sasha Arrington, [facility] [address], [facility] 159  [address]:  330 [facility], 101 [facility]  [phone]  Cathaleen Estimable, and Extractions    [facility]  2301 [facility], [address] [place]  [phone]  Cathaleen Estimable, and Extractions    [facility]  [address] [facility] 159  [phone]  Fillings, Cleaning, and [address] [place]  [address] [place], [facility]7 [facility]  [phone]    [facility] Department  [address], [facility] [address]  [phone]  Ages 3-18, if attending [facility]  [address] [facility], [address] [facility] [address]  [phone]  Extractions, Kwasi Kemp, [facility] Clinical Center    [facility]  [address], [address] [facility] [address]  [phone]  Oral Surgery - $70 required  Brookerajinder Webbo, Extractions - $100 Required    [address]   [address], [address]  [phone]  [facility] Only    [address], 1519 [facility]  Dental Clinic Open September to June

## 2023-01-17 ENCOUNTER — HOSPITAL ENCOUNTER (EMERGENCY)
Age: 33
Discharge: HOME | End: 2023-01-17
Attending: EMERGENCY MEDICINE
Payer: MEDICAID

## 2023-01-17 VITALS
BODY MASS INDEX: 21.66 KG/M2 | TEMPERATURE: 97.8 F | DIASTOLIC BLOOD PRESSURE: 82 MMHG | HEART RATE: 114 BPM | SYSTOLIC BLOOD PRESSURE: 134 MMHG | RESPIRATION RATE: 16 BRPM | OXYGEN SATURATION: 100 % | HEIGHT: 65 IN | WEIGHT: 130 LBS

## 2023-01-17 DIAGNOSIS — K02.9 PAIN DUE TO DENTAL CARIES: Primary | ICD-10-CM

## 2023-01-17 PROCEDURE — 99283 EMERGENCY DEPT VISIT LOW MDM: CPT

## 2023-01-17 RX ORDER — CLINDAMYCIN HYDROCHLORIDE 300 MG/1
300 CAPSULE ORAL 4 TIMES DAILY
Qty: 28 CAPSULE | Refills: 0 | Status: SHIPPED | OUTPATIENT
Start: 2023-01-17 | End: 2023-01-24

## 2023-01-17 NOTE — LETTER
[facility]  [facility]  [address] [facility] [address] [phone]    Work/School Note    Date: 1/17/2023    To Whom It May concern:    Danielle Carmona was seen and treated today in the emergency room by the following provider(s):  Attending Provider: Brandee Vu MD  Physician Assistant: PETEY Augustin. Danielle Carmona may return to work on 1/19/2023.     Sincerely,          Cara Ha RN

## 2023-01-18 NOTE — ED PROVIDER NOTES
[facility] EMERGENCY DEPT  EMERGENCY DEPARTMENT ENCOUNTER       Pt Name: Roxanne Dueñas  MRN: [id number]  [place] 1990  Date of evaluation: 1/17/2023  Provider: PETEY Cabral   PCP: Cali Herrera DO  Note Started: 2215 PM 1/18/23     CHIEF COMPLAINT       Chief Complaint   Patient presents with    Dental Pain        HISTORY OF PRESENT ILLNESS: 1 or more elements      History From: Patient  HPI Limitations : None     Roxanne Dueñas is a 28 y.o. male who presents to ED c/o right upper dental pain patient reports chronic dental pain due to many caries and frequent ED visits for the same. He states he recently completed a course of antibiotics and pain improved but started again over the last couple days. He has taken over-the-counter medications without symptomatic relief. He states that last time he was here he had an adverse reaction to a numbing injection he has an appointment with a dentist at the end of the month. He denies dental injury or trauma facial swelling sore throat or fever. Nursing Notes were all reviewed and agreed with or any disagreements were addressed in the HPI. REVIEW OF SYSTEMS     Positives and Pertinent negatives as per HPI. PAST HISTORY     Past Medical History:  Past Medical History:   Diagnosis Date    Psychiatric disorder     ADHD       Past Surgical History:  History reviewed. No pertinent surgical history. Family History:  History reviewed. No pertinent family history. Social History:  Social History     Tobacco Use    Smoking status: Some Days     Packs/day: 0.25     Types: Cigarettes    Smokeless tobacco: Never   Substance Use Topics    Alcohol use: Yes     Comment: celebrations    Drug use: No       Allergies:   Allergies   Allergen Reactions    Aleve [Naproxen Sodium] Swelling    Ibuprofen Other (comments)     Sweats        CURRENT MEDICATIONS      Discharge Medication List as of 1/17/2023 11:38 PM        CONTINUE these medications which have NOT CHANGED Details   lisdexamfetamine (VYVANSE) 50 mg cap Take 50 mg by mouth daily. Indications: Attention Deficit Disorder with Hyperactivity, Historical Med             SCREENINGS               No data recorded         PHYSICAL EXAM      ED Triage Vitals [01/17/23 2155]   ED Encounter Vitals Group      /82      Pulse (Heart Rate) (!) 114      Resp Rate 16      Temp 97.8 °F (36.6 °C)      Temp src       O2 Sat (%) 100 %      Weight 130 lb      Height 5' 5\"        Physical Exam  Vital signs and nursing notes reviewed. CONSTITUTIONAL: Alert. Well-appearing; well-nourished; in no apparent distress. HEAD: Normocephalic; atraumatic. EYES: Conjunctiva clear. ENT: TM's normal. External ear normal. Normal nose; no rhinorrhea. Normal pharynx. Oral many areas of decayed teeth and some gingivitis with right upper incisor extremely decayed down to gumline and tender to palpation without surrounding gum swelling or oral lesion. No overlying facial swelling.  moist mucus membranes. NECK: Supple; FROM without difficulty, non-tender; no cervical lymphadenopathy. SKIN: Normal for age and race; warm; dry; good turgor; no apparent lesions or exudate. NEURO: A & O x3. PSYCH:  Mood and affect appropriate. DIAGNOSTIC RESULTS   LABS:     No results found for this or any previous visit (from the past 12 hour(s)). No results found.       PROCEDURES   Unless otherwise noted below, none  Procedures     CRITICAL CARE TIME   none    EMERGENCY DEPARTMENT COURSE and DIFFERENTIAL DIAGNOSIS/MDM   Vitals:    Vitals:    01/17/23 2155   BP: 134/82   Pulse: (!) 114   Resp: 16   Temp: 97.8 °F (36.6 °C)   SpO2: 100%   Weight: 59 kg (130 lb)   Height: 5' 5\" (1.651 m)        Patient was given the following medications:  Medications - No data to display    CONSULTS: (Who and What was discussed)  None    Chronic Conditions: none    Social Determinants affecting Dx or Tx: None    Records Reviewed (source and summary): Nursing Notes    ED Course       Medical Decision Making/Disposition Considerations: 80-year-old male with chronic dental caries and frequent infections and ED visits for pain presents with right upper dental pain, no definite abscess but likely early infection due to decayed teeth and atraumatic pain. Given that he recently completed a course of penicillin, will start clindamycin and he has appointment with dentist at the end of the month. I have encouraged him to continue Tylenol as needed for pain, warm salt water swishes and return to ED if any worsening or gum swelling. FINAL IMPRESSION     1. Pain due to dental caries          DISPOSITION/PLAN   Discharged    Discharge Note: The patient is stable for discharge home. The signs, symptoms, diagnosis, and discharge instructions have been discussed, understanding conveyed, and agreed upon. The patient is to follow up as recommended or return to ER should their symptoms worsen. PATIENT REFERRED TO:  Follow-up Information       Follow up With Specialties Details Why Contact Info    Your Dentist   As needed     [facility] Emergency Medicine  As needed, If symptoms worsen [address] [facility] [address]  [phone]              DISCHARGE MEDICATIONS:  Discharge Medication List as of 1/17/2023 11:38 PM        START taking these medications    Details   clindamycin (CLEOCIN) 300 mg capsule Take 1 Capsule by mouth four (4) times daily for 7 days. , Normal, Disp-28 Capsule, R-0           CONTINUE these medications which have NOT CHANGED    Details   lisdexamfetamine (VYVANSE) 50 mg cap Take 50 mg by mouth daily.  Indications: Attention Deficit Disorder with Hyperactivity, Historical Med           STOP taking these medications       penicillin v potassium (VEETID) 500 mg tablet Comments:   Reason for Stopping:         Lidocaine Viscous 2 % solution Comments:   Reason for Stopping:         lidocaine (Lidocaine Viscous) 2 % solution Comments: Reason for Stopping:         methylPREDNISolone (Medrol, Fortunato,) 4 mg tablet Comments:   Reason for Stopping:                 DISCONTINUED MEDICATIONS:  Discharge Medication List as of 1/17/2023 11:38 PM        STOP taking these medications       penicillin v potassium (VEETID) 500 mg tablet Comments:   Reason for Stopping:         Lidocaine Viscous 2 % solution Comments:   Reason for Stopping:         lidocaine (Lidocaine Viscous) 2 % solution Comments:   Reason for Stopping:         methylPREDNISolone (Medrol, Fortunato,) 4 mg tablet Comments:   Reason for Stopping:                   I am the Primary Clinician of Record. (Please note that parts of this dictation were completed with voice recognition software. Quite often unanticipated grammatical, syntax, homophones, and other interpretive errors are inadvertently transcribed by the computer software. Please disregards these errors.  Please excuse any errors that have escaped final proofreading.)

## 2023-02-08 NOTE — ED PROVIDER NOTES
[facility] EMERGENCY DEPT  EMERGENCY DEPARTMENT ENCOUNTER       Pt Name: Uriel Multani  MRN: [id number]  [place] 1990  Date of evaluation: 1/9/2023  Provider: PETEY Patino   PCP: Alejandro Almanzra DO  Note Started: 7:45 PM 1/9/23     CHIEF COMPLAINT       Chief Complaint   Patient presents with    Dental Pain        HISTORY OF PRESENT ILLNESS: 1 or more elements      History From: Patient       Uriel Multani is a 28 y.o. male who presents right upper dental pain this been going on for several weeks now. He has had multiple visits to this emergency department for similar complaint. He did finish the antibiotics previously prescribed. He was seen here yesterday had dental block. States he does have follow-up scheduled dental block. He returns today states that he does have dental follow-up and is requesting something for pain. No facial swelling. No fevers. Nursing Notes were all reviewed and agreed with or any disagreements were addressed in the HPI. REVIEW OF SYSTEMS      Review of Systems   Constitutional:  Negative for chills and fever. HENT:  Positive for dental problem. Negative for ear discharge, ear pain, facial swelling, rhinorrhea, sinus pressure and sinus pain. Musculoskeletal:  Negative for back pain and neck pain. Skin:  Negative for rash. Neurological:  Negative for weakness and headaches. All other systems reviewed and are negative. Positives and Pertinent negatives as per HPI. PAST HISTORY     Past Medical History:  Past Medical History:   Diagnosis Date    Psychiatric disorder     ADHD       Past Surgical History:  No past surgical history on file. Family History:  No family history on file. Social History:  Social History     Tobacco Use    Smoking status: Some Days     Packs/day: 0.25     Types: Cigarettes    Smokeless tobacco: Never   Substance Use Topics    Alcohol use: Yes     Comment: celebrations    Drug use: No       Allergies:   Allergies   Allergen Reactions    Aleve [Naproxen Sodium] Swelling    Ibuprofen Other (comments)     Sweats        CURRENT MEDICATIONS      Discharge Medication List as of 1/9/2023  7:36 PM        CONTINUE these medications which have NOT CHANGED    Details   lidocaine (Lidocaine Viscous) 2 % solution Take 15 mL by mouth as needed for Pain., Normal, Disp-100 mL, R-0      HYDROcodone-acetaminophen (Norco) 5-325 mg per tablet Take 1 Tablet by mouth every eight (8) hours as needed for Pain for up to 5 days. Max Daily Amount: 3 Tablets., Normal, Disp-10 Tablet, R-0Dr. Hernandez Sullivan, ED Attending      methylPREDNISolone (Medrol, Fortnuato,) 4 mg tablet Take with food. , Print, Disp-1 Dose Pack, R-0      lisdexamfetamine (VYVANSE) 50 mg cap Take 50 mg by mouth daily. Indications: Attention Deficit Disorder with Hyperactivity, Historical Med             SCREENINGS               No data recorded         PHYSICAL EXAM      ED Triage Vitals [01/09/23 1805]   ED Encounter Vitals Group      /71      Pulse (Heart Rate) (!) 112      Resp Rate 18      Temp 98.1 °F (36.7 °C)      Temp src       O2 Sat (%) 100 %      Weight       Height         Physical Exam  Vitals and nursing note reviewed. Constitutional:       General: He is not in acute distress. Appearance: He is well-developed. He is not diaphoretic. Comments: Alert, non toxic, appears slightly uncomfortable   HENT:      Head: Normocephalic and atraumatic. Right Ear: Tympanic membrane, ear canal and external ear normal.      Left Ear: Tympanic membrane, ear canal and external ear normal.      Nose: Nose normal.      Mouth/Throat:      Mouth: Mucous membranes are not dry. Dentition: Abnormal dentition. Pharynx: Uvula midline. No oropharyngeal exudate or posterior oropharyngeal erythema. Tonsils: No tonsillar abscesses. Cardiovascular:      Rate and Rhythm: Normal rate and regular rhythm. Heart sounds: Normal heart sounds.    Pulmonary:      Effort: Pulmonary effort is normal. No respiratory distress. Breath sounds: Normal breath sounds. No stridor. No wheezing or rales. Musculoskeletal:      Cervical back: Normal range of motion and neck supple. Lymphadenopathy:      Cervical: No cervical adenopathy. Skin:     General: Skin is warm and dry. Neurological:      Mental Status: He is alert and oriented to person, place, and time. Psychiatric:         Judgment: Judgment normal.        DIAGNOSTIC RESULTS   LABS:     No results found for this or any previous visit (from the past 12 hour(s)). EKG: When ordered, EKG's are interpreted by the Emergency Department Physician in the absence of a cardiologist.  Please see their note for interpretation of EKG. RADIOLOGY:  Non-plain film images such as CT, Ultrasound and MRI are read by the radiologist. Plain radiographic images are visualized and preliminarily interpreted by the ED Provider with the below findings:        Interpretation per the Radiologist below, if available at the time of this note:     No results found. PROCEDURES   Unless otherwise noted below, none  Procedures     CRITICAL CARE TIME       EMERGENCY DEPARTMENT COURSE and DIFFERENTIAL DIAGNOSIS/MDM   Vitals:    Vitals:    01/09/23 1805   BP: 120/71   Pulse: (!) 112   Resp: 18   Temp: 98.1 °F (36.7 °C)   SpO2: 100%        Patient was given the following medications:  Medications   HYDROcodone-acetaminophen (NORCO) 5-325 mg per tablet 1 Tablet (1 Tablet Oral Given 1/9/23 1936)       CONSULTS: (Who and What was discussed)  None    Chronic Conditions: ADHD    Social Determinants affecting Dx or Tx: None    Records Reviewed (source and summary): Nursing Notes and Old Medical Records    CC/HPI Summary, DDx, ED Course, and Reassessment:          Disposition Considerations (Tests not done, Shared Decision Making, Pt Expectation of Test or Tx.): Right upper dental pain that is been going on for the past several weeks.   Patient requesting pain medication. Already completed antibiotic course. He does have follow-up this week questing pain medication. No fevers or swelling no evidence of dental abscess or deeper infection. FINAL IMPRESSION     1. Facial pain    2. Dentalgia    3. Dental caries          DISPOSITION/PLAN   [facility]  results have been reviewed with him. He has been counseled regarding his diagnosis, treatment, and plan. He verbally conveys understanding and agreement of the signs, symptoms, diagnosis, treatment and prognosis and additionally agrees to follow up as discussed. He also agrees with the care-plan and conveys that all of his questions have been answered. I have also provided discharge instructions for him that include: educational information regarding their diagnosis and treatment, and list of reasons why they would want to return to the ED prior to their follow-up appointment, should his condition change. Labs Reviewed - No data to display     No results found for this or any previous visit (from the past 12 hour(s)). No orders to display        CLINICAL IMPRESSION    1. Facial pain    2. Dentalgia    3. Dental caries        Discharge Note: The patient is stable for discharge home. The signs, symptoms, diagnosis, and discharge instructions have been discussed, understanding conveyed, and agreed upon. The patient is to follow up as recommended or return to ER should their symptoms worsen. PATIENT REFERRED TO:  Follow-up Information       Follow up With Specialties Details Why Contact Info    dental clinic        [facility] EMERGENCY DEPT Emergency Medicine   [address] [place] [address] [phone]              DISCHARGE MEDICATIONS:  Discharge Medication List as of 1/9/2023  7:36 PM            DISCONTINUED MEDICATIONS:  Discharge Medication List as of 1/9/2023  7:36 PM          Shared Not Shared REINALDO: I have seen and evaluated the patient.  My supervision physician was available for consultation. I am the Primary Clinician of Record. PETEY Perez (electronically signed)    (Please note that parts of this dictation were completed with voice recognition software. Quite often unanticipated grammatical, syntax, homophones, and other interpretive errors are inadvertently transcribed by the computer software. Please disregards these errors.  Please excuse any errors that have escaped final proofreading.) ---

## 2023-03-12 ENCOUNTER — HOSPITAL ENCOUNTER (EMERGENCY)
Facility: HOSPITAL | Age: 33
Discharge: LEFT BEFORE DISCHARGE | End: 2023-03-12
Attending: EMERGENCY MEDICINE

## 2023-03-12 VITALS
DIASTOLIC BLOOD PRESSURE: 86 MMHG | TEMPERATURE: 97.9 F | RESPIRATION RATE: 12 BRPM | SYSTOLIC BLOOD PRESSURE: 133 MMHG | OXYGEN SATURATION: 100 % | HEART RATE: 64 BPM

## 2023-03-12 ASSESSMENT — PAIN SCALES - GENERAL: PAINLEVEL_OUTOF10: 10

## 2023-06-26 ENCOUNTER — HOSPITAL ENCOUNTER (EMERGENCY)
Facility: HOSPITAL | Age: 33
Discharge: HOME | End: 2023-06-26
Payer: MEDICAID

## 2023-06-26 ENCOUNTER — APPOINTMENT (OUTPATIENT)
Facility: HOSPITAL | Age: 33
End: 2023-06-26
Payer: MEDICAID

## 2023-06-26 VITALS
SYSTOLIC BLOOD PRESSURE: 110 MMHG | OXYGEN SATURATION: 98 % | RESPIRATION RATE: 20 BRPM | HEART RATE: 87 BPM | WEIGHT: 128 LBS | TEMPERATURE: 97.6 F | DIASTOLIC BLOOD PRESSURE: 64 MMHG | HEIGHT: 65 IN | BODY MASS INDEX: 21.33 KG/M2

## 2023-06-26 DIAGNOSIS — S22.42XA CLOSED FRACTURE OF MULTIPLE RIBS OF LEFT SIDE, INITIAL ENCOUNTER: Primary | ICD-10-CM

## 2023-06-26 PROCEDURE — 99283 EMERGENCY DEPT VISIT LOW MDM: CPT

## 2023-06-26 PROCEDURE — 71101 X-RAY EXAM UNILAT RIBS/CHEST: CPT

## 2023-06-26 RX ORDER — OXYCODONE HYDROCHLORIDE AND ACETAMINOPHEN 5; 325 MG/1; MG/1
1 TABLET ORAL EVERY 6 HOURS PRN
Qty: 12 TABLET | Refills: 0 | Status: SHIPPED | OUTPATIENT
Start: 2023-06-26 | End: 2023-06-29

## 2023-06-26 ASSESSMENT — PAIN DESCRIPTION - LOCATION: LOCATION: OTHER (COMMENT)

## 2023-06-26 ASSESSMENT — PAIN - FUNCTIONAL ASSESSMENT: PAIN_FUNCTIONAL_ASSESSMENT: 0-10

## 2023-06-26 ASSESSMENT — PAIN SCALES - GENERAL: PAINLEVEL_OUTOF10: 10

## 2024-01-27 ENCOUNTER — HOSPITAL ENCOUNTER (EMERGENCY)
Facility: HOSPITAL | Age: 34
Discharge: HOME | End: 2024-01-28
Payer: MEDICAID

## 2024-01-27 VITALS
TEMPERATURE: 98 F | HEIGHT: 65 IN | BODY MASS INDEX: 21.66 KG/M2 | RESPIRATION RATE: 20 BRPM | WEIGHT: 130 LBS | DIASTOLIC BLOOD PRESSURE: 76 MMHG | OXYGEN SATURATION: 98 % | SYSTOLIC BLOOD PRESSURE: 119 MMHG | HEART RATE: 98 BPM

## 2024-01-27 DIAGNOSIS — S81.811A LACERATION OF CALF, RIGHT, INITIAL ENCOUNTER: Primary | ICD-10-CM

## 2024-01-27 PROCEDURE — 99283 EMERGENCY DEPT VISIT LOW MDM: CPT

## 2024-01-27 ASSESSMENT — PAIN SCALES - GENERAL: PAINLEVEL_OUTOF10: 5

## 2024-01-28 RX ORDER — CEPHALEXIN 500 MG/1
500 CAPSULE ORAL 2 TIMES DAILY
Qty: 14 CAPSULE | Refills: 0 | Status: SHIPPED | OUTPATIENT
Start: 2024-01-28 | End: 2024-02-04

## 2024-01-28 NOTE — ED TRIAGE NOTES
Pt reports to ed with complaint of laceration to right leg, pt reports injury occurred 1/26/24 but he was unable to be seen, pt reports metal cut leg while at work, pt reports tetanus is up to date, pt AOX4 independent in room,

## 2024-01-28 NOTE — ED NOTES
Pt given verbal and written dc instructions. Pt verbalized understanding of all instructions and exited ED via self ambulation.

## 2024-01-28 NOTE — ED PROVIDER NOTES
[facility] EMERGENCY DEPT  EMERGENCY DEPARTMENT ENCOUNTER       Pt Name: Babita Lara  MRN: [id number]  Birthdate 1990  Date of evaluation: 1/27/2024  PCP: Yosef Juárez DO  Note Started: 12:18 AM 1/27/24     CHIEF COMPLAINT       Chief Complaint   Patient presents with    Laceration        HISTORY OF PRESENT ILLNESS: 1 or more elements      History From: Patient  HPI Limitations: None  Chronic Conditions: ADHD  Social Determinants affecting Dx or Tx: none      Babita Lara is a 33 y.o. male who presents to ED c/o right calf laceration.  Patient states about 36 hours ago he was moving scrap metal when it cut his right calf.  His last tetanus was 1.5 years ago.  He is concerned about developing an infection prompting him to be seen and states he did not transportation to seek care sooner.     Nursing Notes were all reviewed and agreed with or any disagreements were addressed in the HPI.    PAST HISTORY     Past Medical History:  Past Medical History:   Diagnosis Date    Psychiatric disorder     ADHD       Past Surgical History:  History reviewed. No pertinent surgical history.    Family History:  History reviewed. No pertinent family history.    Social History:  Social History     Socioeconomic History    Marital status: Single     Spouse name: None    Number of children: None    Years of education: None    Highest education level: None   Tobacco Use    Smoking status: Every Day     Current packs/day: 0.25     Types: Cigarettes    Smokeless tobacco: Never   Substance and Sexual Activity    Alcohol use: Yes    Drug use: No       Allergies:  Allergies   Allergen Reactions    Ibuprofen Other (See Comments)     Sweats     Naproxen Sodium Swelling       CURRENT MEDICATIONS      No current facility-administered medications for this encounter.     Current Outpatient Medications   Medication Sig Dispense Refill    cephALEXin (KEFLEX) 500 MG capsule Take 1 capsule by mouth 2 times daily for 7 days 14 capsule 0